# Patient Record
Sex: FEMALE | Race: OTHER | HISPANIC OR LATINO
[De-identification: names, ages, dates, MRNs, and addresses within clinical notes are randomized per-mention and may not be internally consistent; named-entity substitution may affect disease eponyms.]

---

## 2017-09-12 ENCOUNTER — RESULT REVIEW (OUTPATIENT)
Age: 62
End: 2017-09-12

## 2019-01-22 ENCOUNTER — RX RENEWAL (OUTPATIENT)
Age: 64
End: 2019-01-22

## 2019-02-25 ENCOUNTER — RX RENEWAL (OUTPATIENT)
Age: 64
End: 2019-02-25

## 2019-02-27 ENCOUNTER — RECORD ABSTRACTING (OUTPATIENT)
Age: 64
End: 2019-02-27

## 2019-02-27 DIAGNOSIS — Z82.49 FAMILY HISTORY OF ISCHEMIC HEART DISEASE AND OTHER DISEASES OF THE CIRCULATORY SYSTEM: ICD-10-CM

## 2019-02-27 DIAGNOSIS — Z83.3 FAMILY HISTORY OF DIABETES MELLITUS: ICD-10-CM

## 2019-02-27 DIAGNOSIS — Z80.42 FAMILY HISTORY OF MALIGNANT NEOPLASM OF PROSTATE: ICD-10-CM

## 2019-03-09 ENCOUNTER — APPOINTMENT (OUTPATIENT)
Dept: INTERNAL MEDICINE | Facility: CLINIC | Age: 64
End: 2019-03-09
Payer: MEDICAID

## 2019-03-09 VITALS
DIASTOLIC BLOOD PRESSURE: 60 MMHG | HEIGHT: 63 IN | BODY MASS INDEX: 23.39 KG/M2 | SYSTOLIC BLOOD PRESSURE: 110 MMHG | HEART RATE: 88 BPM | WEIGHT: 132 LBS

## 2019-03-09 PROCEDURE — 99213 OFFICE O/P EST LOW 20 MIN: CPT | Mod: 25

## 2019-03-09 PROCEDURE — 36415 COLL VENOUS BLD VENIPUNCTURE: CPT

## 2019-03-11 ENCOUNTER — RX RENEWAL (OUTPATIENT)
Age: 64
End: 2019-03-11

## 2019-03-11 LAB
ALBUMIN SERPL ELPH-MCNC: 4.3 G/DL
ALP BLD-CCNC: 61 U/L
ALT SERPL-CCNC: 18 U/L
ANION GAP SERPL CALC-SCNC: 10 MMOL/L
AST SERPL-CCNC: 19 U/L
BILIRUB SERPL-MCNC: 0.4 MG/DL
BUN SERPL-MCNC: 15 MG/DL
CALCIUM SERPL-MCNC: 9.8 MG/DL
CHLORIDE SERPL-SCNC: 101 MMOL/L
CHOLEST SERPL-MCNC: 191 MG/DL
CHOLEST/HDLC SERPL: 2.9 RATIO
CK SERPL-CCNC: 134 U/L
CO2 SERPL-SCNC: 27 MMOL/L
CREAT SERPL-MCNC: 0.59 MG/DL
GLUCOSE SERPL-MCNC: 119 MG/DL
HBA1C MFR BLD HPLC: 6.3 %
HDLC SERPL-MCNC: 66 MG/DL
LDLC SERPL CALC-MCNC: 103 MG/DL
POTASSIUM SERPL-SCNC: 4.7 MMOL/L
PROT SERPL-MCNC: 6.7 G/DL
SODIUM SERPL-SCNC: 138 MMOL/L
TRIGL SERPL-MCNC: 108 MG/DL

## 2019-03-11 NOTE — PLAN
[FreeTextEntry1] : check labs\par Renal meds\par Return 3 months\par order glucometer No indicators present

## 2019-03-11 NOTE — PHYSICAL EXAM
[No Acute Distress] : no acute distress [Well Nourished] : well nourished [No JVD] : no jugular venous distention [No Respiratory Distress] : no respiratory distress  [Clear to Auscultation] : lungs were clear to auscultation bilaterally [Normal Rate] : normal rate  [Regular Rhythm] : with a regular rhythm [Normal S1, S2] : normal S1 and S2 [No Murmur] : no murmur heard [No Carotid Bruits] : no carotid bruits [No Edema] : there was no peripheral edema [Soft] : abdomen soft [de-identified] : no xanthelasma

## 2019-03-11 NOTE — HISTORY OF PRESENT ILLNESS
[FreeTextEntry1] : DM f/u - not seen in 5 months [de-identified] : Diabetes:\par The patient has been diagnosed with diabetes since 2018.  The frequency of monitoring is NA.  Hypoglycemic episodes are not known.  The results of the last hemoglobin A1c's were 7.5 Oct 2018.  Side effects of the medications include none. Compliance with the medical regimen has been good.  \par Hypercholesterolemia: Last  Oct\par Since the last visit, the patient hhas no complaints. Major cardiovascular risk factors include hyperlipidemia and diabetes. The LDL goal for this patient is < 100 mg/dl.  Dietary modifications have included a low fat diet.  Exercise modifications have included walking. Responses to the medications have been unknown. The patient's adherence to the medication treatment is good. the patient's weight has CHANGED BY -7. The side effects of the medication include none; the patient specifically denies myalgias.

## 2019-03-11 NOTE — REVIEW OF SYSTEMS
[Heartburn] : heartburn [Chest Pain] : no chest pain [Palpitations] : no palpitations [Frequency] : no frequency [Joint Pain] : no joint pain [Skin Rash] : no skin rash [Headache] : no headache [Dizziness] : no dizziness

## 2019-03-11 NOTE — ASSESSMENT
[FreeTextEntry1] : Check glucose and A1c. Urged to increase efforts at weight loss through diet and exercise. Nutrition counseling, as well as eye and  foot care, discussed. Continue medications. Check lipid profile with goal of LDL less than 100 mg/dl. A1c goal is less than 7.0. Last A1c was NOT at goal 7.5 off med\par \par Check lipid profile and liver function tests. Urged low cholesterol diet as well as weight loss through diet and exercise. Continue medications. LDL goal is less than 100 mg/dl. Last LDL was NOT at goal, 167

## 2019-03-12 ENCOUNTER — RX RENEWAL (OUTPATIENT)
Age: 64
End: 2019-03-12

## 2019-06-05 ENCOUNTER — APPOINTMENT (OUTPATIENT)
Dept: PODIATRY | Facility: CLINIC | Age: 64
End: 2019-06-05
Payer: MEDICARE

## 2019-06-05 VITALS
HEIGHT: 63 IN | WEIGHT: 132 LBS | DIASTOLIC BLOOD PRESSURE: 70 MMHG | BODY MASS INDEX: 23.39 KG/M2 | SYSTOLIC BLOOD PRESSURE: 120 MMHG

## 2019-06-05 PROCEDURE — 99203 OFFICE O/P NEW LOW 30 MIN: CPT | Mod: 25

## 2019-06-05 PROCEDURE — 11721 DEBRIDE NAIL 6 OR MORE: CPT | Mod: 59

## 2019-06-05 NOTE — REASON FOR VISIT
[Initial Evaluation] : an initial evaluation [Family Member] : family member [FreeTextEntry1] : bilateral foot pain

## 2019-06-05 NOTE — PHYSICAL EXAM
[General Appearance - Alert] : alert [General Appearance - In No Acute Distress] : in no acute distress [Deep Tendon Reflexes (DTR)] : deep tendon reflexes were 2+ and symmetric [No Focal Deficits] : no focal deficits [Motor Exam] : the motor exam was normal [Impaired Insight] : insight and judgment were intact [Oriented To Time, Place, And Person] : oriented to person, place, and time [Affect] : the affect was normal [Right Foot Was Examined] : The right foot was examined [Left Foot Was Examined] : The left foot was examined [Normal Appearance] : normal in appearance [Delayed in the Right Toes] : delayed in the toes [Normal in Appearance] : normal in appearance [Normal] : normal [Full ROM] : with full range of motion [2+] : 2+ in the dorsalis pedis [Vibration Dec.] : diminished vibratory sensation at the level of the toes [Diminished Throughout Right Foot] : diminished tactile sensation with monofilament testing throughout right foot [Position Sense Dec.] : diminished position sense at the level of the toes [Diminished Throughout Left Foot] : diminished tactile sensation with monofilament testing throughout left foot [Tenderness] : not tender [Erythema] : not erythematous [Swelling] : not swollen [FreeTextEntry2] : hallux nails [FreeTextEntry6] : hallux nails [FreeTextEntry1] : 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

## 2019-06-05 NOTE — HISTORY OF PRESENT ILLNESS
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

## 2019-06-05 NOTE — REVIEW OF SYSTEMS
[Arthralgias] : arthralgias [Joint Stiffness] : joint stiffness [Negative] : Neurological [Joint Swelling] : no joint swelling

## 2019-06-05 NOTE — PROCEDURE
[FreeTextEntry1] : I had a lengthy discussion with the patient regarding the way they should be cutting a nail in an effort to help prevent recurrence of this problem. I also revised self treatment should not be attempted and should there be any redness or pain on the side of the nail rather than treating it themselves they should call the office to make a follow up.\par Treatment options d/w her daughter\par Using sterile instrumentation and aggressive slant back procedure was done to the affected border. Upon removal of the distal aspect of the nail one notes a severely callused nail groove. At that point using a sterile 15 blade as well as the aid of a Holland blade the area was debrided, packed with Silvadene and a dry sterile bandage applied. Discharge instructions were given to the patient and advised bacitracin daily for the next 3-5 days and if not completely better they should contact the office immediately.\par

## 2019-06-10 ENCOUNTER — RX RENEWAL (OUTPATIENT)
Age: 64
End: 2019-06-10

## 2019-06-22 ENCOUNTER — APPOINTMENT (OUTPATIENT)
Dept: INTERNAL MEDICINE | Facility: CLINIC | Age: 64
End: 2019-06-22

## 2019-07-08 ENCOUNTER — RX RENEWAL (OUTPATIENT)
Age: 64
End: 2019-07-08

## 2019-07-11 ENCOUNTER — APPOINTMENT (OUTPATIENT)
Dept: INTERNAL MEDICINE | Facility: CLINIC | Age: 64
End: 2019-07-11
Payer: MEDICARE

## 2019-07-11 VITALS
BODY MASS INDEX: 23.92 KG/M2 | DIASTOLIC BLOOD PRESSURE: 60 MMHG | WEIGHT: 135 LBS | HEIGHT: 63 IN | SYSTOLIC BLOOD PRESSURE: 110 MMHG | HEART RATE: 91 BPM

## 2019-07-11 PROCEDURE — G0009: CPT

## 2019-07-11 PROCEDURE — 36415 COLL VENOUS BLD VENIPUNCTURE: CPT

## 2019-07-11 PROCEDURE — 90670 PCV13 VACCINE IM: CPT

## 2019-07-11 PROCEDURE — 99213 OFFICE O/P EST LOW 20 MIN: CPT | Mod: 25

## 2019-07-11 RX ORDER — ATORVASTATIN CALCIUM 40 MG/1
40 TABLET, FILM COATED ORAL DAILY
Qty: 90 | Refills: 0 | Status: DISCONTINUED | COMMUNITY
Start: 2019-03-12 | End: 2019-07-11

## 2019-07-11 RX ORDER — IBUPROFEN 400 MG/1
400 TABLET, FILM COATED ORAL 3 TIMES DAILY
Qty: 100 | Refills: 1 | Status: DISCONTINUED | COMMUNITY
Start: 2019-03-09 | End: 2019-07-11

## 2019-07-11 NOTE — ASSESSMENT
[FreeTextEntry1] : DM- Check glucose and A1c. Nutrition counseling, as well as eye and  foot care, discussed. Continue medication. Check lipid profile with goal of LDL less than 100 mg/dl. A1c goal is less than 7.0. Last A1c was at goal 6.3\par \par Check lipid profile and liver function tests. Urged low cholesterol diet as well as weight loss through diet and exercise. Continue medications. LDL goal is less than 100 mg/dl. Last LDL was NOT at goal, 103. Will try simvastatin to see if that causes myalgias.\par \par Check Hep C. \par \par Risks/benefits of Prevnar 13 vaccine d/w patient and patient understands and accepts.

## 2019-07-11 NOTE — PHYSICAL EXAM
[No Acute Distress] : no acute distress [No JVD] : no jugular venous distention [No Respiratory Distress] : no respiratory distress  [Clear to Auscultation] : lungs were clear to auscultation bilaterally [Normal Rate] : normal rate  [Regular Rhythm] : with a regular rhythm [Normal S1, S2] : normal S1 and S2 [No Murmur] : no murmur heard [No Edema] : there was no peripheral edema [de-identified] : no xanthelasmas

## 2019-07-11 NOTE — REVIEW OF SYSTEMS
[Heartburn] : heartburn [Muscle Pain] : muscle pain [Chest Pain] : no chest pain [Joint Pain] : no joint pain [Palpitations] : no palpitations [Frequency] : no frequency [Headache] : no headache [Skin Rash] : no skin rash [Dizziness] : no dizziness

## 2019-07-12 ENCOUNTER — APPOINTMENT (OUTPATIENT)
Dept: PODIATRY | Facility: CLINIC | Age: 64
End: 2019-07-12

## 2019-07-12 LAB
ALBUMIN SERPL ELPH-MCNC: 4.2 G/DL
ALP BLD-CCNC: 75 U/L
ALT SERPL-CCNC: 13 U/L
ANION GAP SERPL CALC-SCNC: 13 MMOL/L
AST SERPL-CCNC: 20 U/L
BILIRUB SERPL-MCNC: 0.3 MG/DL
BUN SERPL-MCNC: 19 MG/DL
CALCIUM SERPL-MCNC: 10.1 MG/DL
CHLORIDE SERPL-SCNC: 102 MMOL/L
CHOLEST SERPL-MCNC: 203 MG/DL
CHOLEST/HDLC SERPL: 3.6 RATIO
CO2 SERPL-SCNC: 25 MMOL/L
CREAT SERPL-MCNC: 0.64 MG/DL
ESTIMATED AVERAGE GLUCOSE: 146 MG/DL
GLUCOSE SERPL-MCNC: 103 MG/DL
HBA1C MFR BLD HPLC: 6.7 %
HDLC SERPL-MCNC: 57 MG/DL
LDLC SERPL CALC-MCNC: 107 MG/DL
POTASSIUM SERPL-SCNC: 4.7 MMOL/L
PROT SERPL-MCNC: 7.3 G/DL
SODIUM SERPL-SCNC: 140 MMOL/L
TRIGL SERPL-MCNC: 194 MG/DL

## 2019-10-02 ENCOUNTER — APPOINTMENT (OUTPATIENT)
Dept: INTERNAL MEDICINE | Facility: CLINIC | Age: 64
End: 2019-10-02
Payer: MEDICARE

## 2019-10-02 ENCOUNTER — LABORATORY RESULT (OUTPATIENT)
Age: 64
End: 2019-10-02

## 2019-10-02 VITALS
WEIGHT: 133 LBS | DIASTOLIC BLOOD PRESSURE: 70 MMHG | BODY MASS INDEX: 23.56 KG/M2 | HEART RATE: 87 BPM | SYSTOLIC BLOOD PRESSURE: 108 MMHG

## 2019-10-02 PROCEDURE — 99215 OFFICE O/P EST HI 40 MIN: CPT | Mod: 25

## 2019-10-02 PROCEDURE — 90662 IIV NO PRSV INCREASED AG IM: CPT

## 2019-10-02 PROCEDURE — G0008: CPT

## 2019-10-02 PROCEDURE — G0438: CPT

## 2019-10-02 PROCEDURE — G0402 INITIAL PREVENTIVE EXAM: CPT

## 2019-10-02 PROCEDURE — 36415 COLL VENOUS BLD VENIPUNCTURE: CPT

## 2019-10-02 PROCEDURE — 93000 ELECTROCARDIOGRAM COMPLETE: CPT

## 2019-10-02 NOTE — PHYSICAL EXAM
[No Acute Distress] : no acute distress [Well Nourished] : well nourished [Well Developed] : well developed [Well-Appearing] : well-appearing [Normal Sclera/Conjunctiva] : normal sclera/conjunctiva [PERRL] : pupils equal round and reactive to light [Normal Outer Ear/Nose] : the outer ears and nose were normal in appearance [EOMI] : extraocular movements intact [Normal Oropharynx] : the oropharynx was normal [No JVD] : no jugular venous distention [No Lymphadenopathy] : no lymphadenopathy [Supple] : supple [Thyroid Normal, No Nodules] : the thyroid was normal and there were no nodules present [No Respiratory Distress] : no respiratory distress  [No Accessory Muscle Use] : no accessory muscle use [Clear to Auscultation] : lungs were clear to auscultation bilaterally [Normal Rate] : normal rate  [Regular Rhythm] : with a regular rhythm [Normal S1, S2] : normal S1 and S2 [No Carotid Bruits] : no carotid bruits [No Murmur] : no murmur heard [No Varicosities] : no varicosities [No Abdominal Bruit] : a ~M bruit was not heard ~T in the abdomen [Pedal Pulses Present] : the pedal pulses are present [No Edema] : there was no peripheral edema [No Palpable Aorta] : no palpable aorta [No Extremity Clubbing/Cyanosis] : no extremity clubbing/cyanosis [Soft] : abdomen soft [Non Tender] : non-tender [Non-distended] : non-distended [No Masses] : no abdominal mass palpated [No HSM] : no HSM [Normal Bowel Sounds] : normal bowel sounds [Normal Posterior Cervical Nodes] : no posterior cervical lymphadenopathy [Normal Anterior Cervical Nodes] : no anterior cervical lymphadenopathy [No CVA Tenderness] : no CVA  tenderness [No Spinal Tenderness] : no spinal tenderness [No Joint Swelling] : no joint swelling [Grossly Normal Strength/Tone] : grossly normal strength/tone [No Rash] : no rash [Coordination Grossly Intact] : coordination grossly intact [No Focal Deficits] : no focal deficits [Normal Gait] : normal gait [Deep Tendon Reflexes (DTR)] : deep tendon reflexes were 2+ and symmetric [Normal Affect] : the affect was normal [Normal Insight/Judgement] : insight and judgment were intact [de-identified] : +small t. palatini

## 2019-10-02 NOTE — PLAN
[FreeTextEntry1] : check labs and EKG\par Continue meds except stop ranitidine and start famotidine\par Flu vaccine\par Return 3 months

## 2019-10-02 NOTE — HISTORY OF PRESENT ILLNESS
[FreeTextEntry1] : here for f/u of DM, Chol\par Wants flu vaccine [de-identified] : Diabetes:\par The patient has been diagnosed with diabetes since 2018.  The frequency of monitoring is NA.  Hypoglycemic episodes are not known.  The results of the last hemoglobin A1c's were 6.7 July, 6.3 Mar 2019;  7.5 Oct 2018.  Side effects of the medications include none. Compliance with the medical regimen has been good.  \par \par Hypercholesterolemia: Last  July\par Since the last visit, the patient has no complaints. Major cardiovascular risk factors include hyperlipidemia and diabetes. The LDL goal for this patient is < 100 mg/dl.  Dietary modifications have included a low fat diet.  Exercise modifications have included walking. Responses to the medications have been FAIR. The patient's adherence to the medication treatment is POOR. The side effects of the medication include none; the patient specifically STATES SHE GETS MYALGIAS. The patient's weight has CHANGED BY -2

## 2019-10-02 NOTE — REVIEW OF SYSTEMS
[Heartburn] : heartburn [Nocturia] : nocturia [Fever] : no fever [Chills] : no chills [Hearing Loss] : no hearing loss [Chest Pain] : no chest pain [Palpitations] : no palpitations [Shortness Of Breath] : no shortness of breath [Wheezing] : no wheezing [Cough] : no cough [Abdominal Pain] : no abdominal pain [Nausea] : no nausea [Vomiting] : no vomiting [Dysuria] : no dysuria [Hematuria] : no hematuria [Frequency] : no frequency [Muscle Weakness] : no muscle weakness [Skin Rash] : no skin rash [Headache] : no headache [Dizziness] : no dizziness [Memory Loss] : no memory loss [Anxiety] : no anxiety [Depression] : no depression [Easy Bleeding] : no easy bleeding [FreeTextEntry3] : last eye exam 2 years ago; + eyewear [FreeTextEntry8] : nocturia x 1

## 2019-10-02 NOTE — ASSESSMENT
[FreeTextEntry1] : DM- Check glucose and A1c. Nutrition counseling, as well as eye and  foot care, discussed. Continue medication. Check lipid profile with goal of LDL less than 100 mg/dl. A1c goal is less than 7.0. Last A1c was at goal 6.7\par \par Check lipid profile and liver function tests. Urged low cholesterol diet as well as weight loss through diet and exercise. Continue medications. LDL goal is less than 100 mg/dl. Last LDL was NOT at goal, 107. Will try simvastatin to see if that causes myalgias but pt defers currently.\par \par Risks/benefits of flu vaccine d/w patient and patient understands and accepts.

## 2019-10-03 LAB
ALBUMIN SERPL ELPH-MCNC: 4.3 G/DL
ALP BLD-CCNC: 69 U/L
ALT SERPL-CCNC: 15 U/L
ANION GAP SERPL CALC-SCNC: 12 MMOL/L
APPEARANCE: CLEAR
AST SERPL-CCNC: 18 U/L
BASOPHILS # BLD AUTO: 0.06 K/UL
BASOPHILS NFR BLD AUTO: 0.9 %
BILIRUB SERPL-MCNC: 0.7 MG/DL
BILIRUBIN URINE: NEGATIVE
BLOOD URINE: NEGATIVE
BUN SERPL-MCNC: 16 MG/DL
CALCIUM SERPL-MCNC: 10 MG/DL
CHLORIDE SERPL-SCNC: 102 MMOL/L
CHOLEST SERPL-MCNC: 246 MG/DL
CHOLEST/HDLC SERPL: 3.8 RATIO
CK SERPL-CCNC: 162 U/L
CO2 SERPL-SCNC: 27 MMOL/L
COLOR: YELLOW
CREAT SERPL-MCNC: 0.67 MG/DL
EOSINOPHIL # BLD AUTO: 0.22 K/UL
EOSINOPHIL NFR BLD AUTO: 3.4 %
ESTIMATED AVERAGE GLUCOSE: 143 MG/DL
GLUCOSE QUALITATIVE U: NEGATIVE
GLUCOSE SERPL-MCNC: 153 MG/DL
HBA1C MFR BLD HPLC: 6.6 %
HCT VFR BLD CALC: 42.4 %
HDLC SERPL-MCNC: 65 MG/DL
HGB BLD-MCNC: 13.2 G/DL
IMM GRANULOCYTES NFR BLD AUTO: 0.6 %
KETONES URINE: NEGATIVE
LDLC SERPL CALC-MCNC: 156 MG/DL
LEUKOCYTE ESTERASE URINE: ABNORMAL
LYMPHOCYTES # BLD AUTO: 1.9 K/UL
LYMPHOCYTES NFR BLD AUTO: 29.6 %
MAN DIFF?: NORMAL
MCHC RBC-ENTMCNC: 29.3 PG
MCHC RBC-ENTMCNC: 31.1 GM/DL
MCV RBC AUTO: 94 FL
MONOCYTES # BLD AUTO: 0.65 K/UL
MONOCYTES NFR BLD AUTO: 10.1 %
NEUTROPHILS # BLD AUTO: 3.55 K/UL
NEUTROPHILS NFR BLD AUTO: 55.4 %
NITRITE URINE: NEGATIVE
PH URINE: 6.5
PLATELET # BLD AUTO: 276 K/UL
POTASSIUM SERPL-SCNC: 4.9 MMOL/L
PROT SERPL-MCNC: 7.1 G/DL
PROTEIN URINE: NORMAL
RBC # BLD: 4.51 M/UL
RBC # FLD: 13.8 %
SODIUM SERPL-SCNC: 141 MMOL/L
SPECIFIC GRAVITY URINE: 1.02
TRIGL SERPL-MCNC: 126 MG/DL
TSH SERPL-ACNC: 1.16 UIU/ML
UROBILINOGEN URINE: NORMAL
WBC # FLD AUTO: 6.42 K/UL

## 2019-10-08 ENCOUNTER — NON-APPOINTMENT (OUTPATIENT)
Age: 64
End: 2019-10-08

## 2019-10-14 ENCOUNTER — RX RENEWAL (OUTPATIENT)
Age: 64
End: 2019-10-14

## 2019-11-27 ENCOUNTER — APPOINTMENT (OUTPATIENT)
Dept: INTERNAL MEDICINE | Facility: CLINIC | Age: 64
End: 2019-11-27
Payer: MEDICARE

## 2019-11-27 VITALS
WEIGHT: 133 LBS | RESPIRATION RATE: 18 BRPM | OXYGEN SATURATION: 97 % | HEIGHT: 63 IN | DIASTOLIC BLOOD PRESSURE: 60 MMHG | SYSTOLIC BLOOD PRESSURE: 108 MMHG | BODY MASS INDEX: 23.57 KG/M2 | TEMPERATURE: 98.9 F | HEART RATE: 102 BPM

## 2019-11-27 PROCEDURE — 99496 TRANSJ CARE MGMT HIGH F2F 7D: CPT | Mod: 25

## 2019-11-27 PROCEDURE — 36415 COLL VENOUS BLD VENIPUNCTURE: CPT

## 2019-11-27 PROCEDURE — 99213 OFFICE O/P EST LOW 20 MIN: CPT | Mod: 25

## 2019-11-27 NOTE — ASSESSMENT
[FreeTextEntry1] : Doing well status post treatment of community-acquired pneumonia with positive RSV test. Discussed importance of rechecking chest x-ray  in 3 more weeks.

## 2019-11-27 NOTE — REVIEW OF SYSTEMS
[Cough] : cough [Fever] : no fever [Chills] : no chills [Fatigue] : no fatigue [Chest Pain] : no chest pain [Orthopnea] : no orthopnea [Shortness Of Breath] : no shortness of breath [Wheezing] : no wheezing [Dyspnea on Exertion] : no dyspnea on exertion

## 2019-11-27 NOTE — HISTORY OF PRESENT ILLNESS
[Post-hospitalization from ___ Hospital] : Post-hospitalization from [unfilled] Hospital [Admitted on: ___] : The patient was admitted on [unfilled] [Discharged on ___] : discharged on [unfilled] [Discharge Summary] : discharge summary [Pertinent Labs] : pertinent labs [Radiology Findings] : radiology findings [Discharge Med List] : discharge medication list [Med Reconciliation] : medication reconciliation has been completed [Patient Contacted By: ____] : and contacted by [unfilled] [FreeTextEntry2] : amadeo was admitted with likely respiratory syncytial virus and pneumonia. She had an abnormal urinalysis but a culture which grew out mixed organisms. She was discharged to continue and complete the Z-Roberto and cefuroxime therapy. Since discharge she has felt better although still has a slightly nonproductive cough. Denies fever or shortness of breath.

## 2019-11-27 NOTE — PHYSICAL EXAM
[No JVD] : no jugular venous distention [Normal] : no respiratory distress, lungs were clear to auscultation bilaterally and no accessory muscle use [Normal Rate] : normal rate  [Regular Rhythm] : with a regular rhythm [Normal S1, S2] : normal S1 and S2 [No Edema] : there was no peripheral edema

## 2019-11-30 LAB
ALBUMIN SERPL ELPH-MCNC: 4 G/DL
ALP BLD-CCNC: 89 U/L
ALT SERPL-CCNC: 49 U/L
ANION GAP SERPL CALC-SCNC: 14 MMOL/L
AST SERPL-CCNC: 33 U/L
BASOPHILS # BLD AUTO: 0.06 K/UL
BASOPHILS NFR BLD AUTO: 0.7 %
BILIRUB SERPL-MCNC: 0.2 MG/DL
BUN SERPL-MCNC: 14 MG/DL
CALCIUM SERPL-MCNC: 10.1 MG/DL
CHLORIDE SERPL-SCNC: 97 MMOL/L
CO2 SERPL-SCNC: 24 MMOL/L
CREAT SERPL-MCNC: 0.62 MG/DL
EOSINOPHIL # BLD AUTO: 0.36 K/UL
EOSINOPHIL NFR BLD AUTO: 4.3 %
GLUCOSE SERPL-MCNC: 162 MG/DL
HCT VFR BLD CALC: 40.5 %
HGB BLD-MCNC: 13.1 G/DL
IMM GRANULOCYTES NFR BLD AUTO: 2.2 %
LYMPHOCYTES # BLD AUTO: 2.47 K/UL
LYMPHOCYTES NFR BLD AUTO: 29.2 %
MAN DIFF?: NORMAL
MCHC RBC-ENTMCNC: 29.2 PG
MCHC RBC-ENTMCNC: 32.3 GM/DL
MCV RBC AUTO: 90.2 FL
MONOCYTES # BLD AUTO: 0.76 K/UL
MONOCYTES NFR BLD AUTO: 9 %
NEUTROPHILS # BLD AUTO: 4.61 K/UL
NEUTROPHILS NFR BLD AUTO: 54.6 %
PLATELET # BLD AUTO: 466 K/UL
POTASSIUM SERPL-SCNC: 4.6 MMOL/L
PROT SERPL-MCNC: 7.2 G/DL
RBC # BLD: 4.49 M/UL
RBC # FLD: 13.1 %
SODIUM SERPL-SCNC: 135 MMOL/L
WBC # FLD AUTO: 8.45 K/UL

## 2019-12-02 ENCOUNTER — APPOINTMENT (OUTPATIENT)
Dept: CARE COORDINATION | Facility: HOME HEALTH | Age: 64
End: 2019-12-02
Payer: MEDICARE

## 2019-12-02 VITALS
OXYGEN SATURATION: 98 % | SYSTOLIC BLOOD PRESSURE: 108 MMHG | RESPIRATION RATE: 18 BRPM | DIASTOLIC BLOOD PRESSURE: 60 MMHG | HEART RATE: 87 BPM

## 2019-12-02 PROCEDURE — 99349 HOME/RES VST EST MOD MDM 40: CPT

## 2019-12-02 NOTE — PLAN
[FreeTextEntry1] : "[employer] TCM STARS teaching: Advised patient to adhere to all medications. Patient understands proper use of oxygen therapy. Increase activity as tolerated and maintain optimal activity levels. Continue coughing and deep breathing exercises including use of Incentive Spirometry. Receive routine pneumococcal and influenza vaccinations. Identify early signs and sx of disease and notify NP/MD. Maintain proper nutrition and hydration. Follow up with Pulmonologist within 7 days of discharge. Contact information given, patient advised to call with any questions/concerns.\par \par

## 2019-12-02 NOTE — HISTORY OF PRESENT ILLNESS
[FreeTextEntry1] : PNA [de-identified] : Pt being seen in her home, states that cough has gotten much better.  Still coughing and does not feel her strength has fully returned.  \par Pt completed to antibiotics. \par DM- stable\par Was seen by PCP last Wednesday.

## 2019-12-26 ENCOUNTER — RESULT REVIEW (OUTPATIENT)
Age: 64
End: 2019-12-26

## 2020-01-02 ENCOUNTER — APPOINTMENT (OUTPATIENT)
Dept: INTERNAL MEDICINE | Facility: CLINIC | Age: 65
End: 2020-01-02
Payer: MEDICARE

## 2020-01-02 VITALS
WEIGHT: 138 LBS | HEART RATE: 90 BPM | DIASTOLIC BLOOD PRESSURE: 60 MMHG | BODY MASS INDEX: 24.45 KG/M2 | SYSTOLIC BLOOD PRESSURE: 108 MMHG

## 2020-01-02 PROCEDURE — 99213 OFFICE O/P EST LOW 20 MIN: CPT | Mod: 25

## 2020-01-02 PROCEDURE — 36415 COLL VENOUS BLD VENIPUNCTURE: CPT

## 2020-01-02 NOTE — PLAN
[FreeTextEntry1] : Check labs and continue meds\par Start WelChol 3 tablets daily\par Return 3 months

## 2020-01-02 NOTE — ASSESSMENT
[FreeTextEntry1] : DM- Check glucose and A1c. Nutrition counseling, as well as eye and foot care, discussed. Continue medication. Check lipid profile with goal of LDL less than 100 mg/dl. A1c goal is less than 7.0. Last A1c was at goal 6.6\par \par Check lipid profile and liver function tests. Urged low cholesterol diet as well as weight loss through diet and exercise. Continue medications. LDL goal is less than 100 mg/dl. Last LDL was NOT at goal, 156. As she gets myalgias with statins we discussed trying Welchol. We'll start with 3 tablets daily\par

## 2020-01-02 NOTE — REVIEW OF SYSTEMS
[Fever] : no fever [Chills] : no chills [Fatigue] : no fatigue [Orthopnea] : no orthopnea [Chest Pain] : no chest pain [Wheezing] : no wheezing [Shortness Of Breath] : no shortness of breath [Cough] : no cough [Dyspnea on Exertion] : no dyspnea on exertion

## 2020-01-02 NOTE — PHYSICAL EXAM
[No JVD] : no jugular venous distention [Normal Rate] : normal rate  [Normal] : no respiratory distress, lungs were clear to auscultation bilaterally and no accessory muscle use [Normal S1, S2] : normal S1 and S2 [Regular Rhythm] : with a regular rhythm [No Edema] : there was no peripheral edema

## 2020-01-02 NOTE — HISTORY OF PRESENT ILLNESS
[FreeTextEntry1] : hyperlipidemia, DM f/u  [de-identified] : Diabetes:\par The patient has been diagnosed with diabetes since 2018.  The frequency of monitoring is NA.  Hypoglycemic episodes are not known.  The results of the last hemoglobin A1c's were 6.6 Oct, 6.7 July, 6.3 Mar 2019;  7.5 Oct 2018.  Side effects of the medications include none. Compliance with the medical regimen has been good.  \par \par Hypercholesterolemia: Last  Oct\par Since the last visit, the patient has no complaints. Major cardiovascular risk factors include hyperlipidemia and diabetes. The LDL goal for this patient is < 100 mg/dl.  Dietary modifications have included a low fat diet.  Exercise modifications have included walking. Responses to the medications have been FAIR. The patient's adherence to the medication treatment is POOR. The side effects of the medication include none; the patient specifically STATES SHE GETS MYALGIAS. The patient's weight has CHANGED BY +5

## 2020-01-03 LAB
ALBUMIN SERPL ELPH-MCNC: 4.4 G/DL
ALP BLD-CCNC: 73 U/L
ALT SERPL-CCNC: 19 U/L
ANION GAP SERPL CALC-SCNC: 13 MMOL/L
AST SERPL-CCNC: 20 U/L
BILIRUB SERPL-MCNC: 0.5 MG/DL
BUN SERPL-MCNC: 20 MG/DL
CALCIUM SERPL-MCNC: 10.1 MG/DL
CHLORIDE SERPL-SCNC: 100 MMOL/L
CHOLEST SERPL-MCNC: 255 MG/DL
CHOLEST/HDLC SERPL: 3.6 RATIO
CO2 SERPL-SCNC: 26 MMOL/L
CREAT SERPL-MCNC: 0.57 MG/DL
ESTIMATED AVERAGE GLUCOSE: 154 MG/DL
GLUCOSE SERPL-MCNC: 127 MG/DL
HBA1C MFR BLD HPLC: 7 %
HDLC SERPL-MCNC: 71 MG/DL
LDLC SERPL CALC-MCNC: 159 MG/DL
POTASSIUM SERPL-SCNC: 5.1 MMOL/L
PROT SERPL-MCNC: 7.4 G/DL
SODIUM SERPL-SCNC: 139 MMOL/L
TRIGL SERPL-MCNC: 126 MG/DL

## 2020-01-04 ENCOUNTER — RX RENEWAL (OUTPATIENT)
Age: 65
End: 2020-01-04

## 2020-03-28 ENCOUNTER — APPOINTMENT (OUTPATIENT)
Dept: INTERNAL MEDICINE | Facility: CLINIC | Age: 65
End: 2020-03-28

## 2020-06-20 ENCOUNTER — APPOINTMENT (OUTPATIENT)
Dept: INTERNAL MEDICINE | Facility: CLINIC | Age: 65
End: 2020-06-20
Payer: MEDICARE

## 2020-06-20 VITALS
HEIGHT: 63 IN | BODY MASS INDEX: 24.45 KG/M2 | DIASTOLIC BLOOD PRESSURE: 70 MMHG | HEART RATE: 85 BPM | WEIGHT: 138 LBS | SYSTOLIC BLOOD PRESSURE: 110 MMHG

## 2020-06-20 VITALS
DIASTOLIC BLOOD PRESSURE: 70 MMHG | BODY MASS INDEX: 24.45 KG/M2 | SYSTOLIC BLOOD PRESSURE: 110 MMHG | WEIGHT: 138 LBS | HEIGHT: 63 IN | HEART RATE: 85 BPM

## 2020-06-20 PROCEDURE — 99213 OFFICE O/P EST LOW 20 MIN: CPT | Mod: 25

## 2020-06-20 PROCEDURE — 36415 COLL VENOUS BLD VENIPUNCTURE: CPT

## 2020-06-20 NOTE — REVIEW OF SYSTEMS
[Chills] : no chills [Fever] : no fever [Chest Pain] : no chest pain [Orthopnea] : no orthopnea [Fatigue] : no fatigue [Cough] : no cough [Wheezing] : no wheezing [Shortness Of Breath] : no shortness of breath [Dyspnea on Exertion] : no dyspnea on exertion

## 2020-06-20 NOTE — ASSESSMENT
[FreeTextEntry1] : DM- Check glucose and A1c. Nutrition counseling, as well as eye and foot care, discussed. Continue medication. Check lipid profile with goal of LDL less than 100 mg/dl. A1c goal is less than 7.0. Last A1c was ALMOST at goal 7.0. Generally has low bp's. WIll try to start acei nv for renoprotection. check rft's. May try Jardiance if A1c >7.0.\par \par CHOL- Check lipid profile and liver function tests. Urged low cholesterol diet as well as weight loss through diet and exercise. Continue medications. LDL goal is less than 100 mg/dl. Last LDL was NOT at goal, 159. INSURANCE WILL NOT COVER MED AND SHE CANNOT AFFORD IT ACC TO PT AND HER DTR.\par \par I discussed covid antibody testing with patient and she consents.

## 2020-06-20 NOTE — PLAN
[FreeTextEntry1] : check labs and refill meds\par May start ACE inhibitor next visit\par Covid antibody test\par Return 3 months

## 2020-06-20 NOTE — HISTORY OF PRESENT ILLNESS
[FreeTextEntry1] : hyperlipidemia, DM f/u \par Intolerant of statins; Welchol ordered Jan 2020 at 3 tabs daily but family states insurance would not cover\par Not seen in 5 months [de-identified] : Diabetes:\par The patient has been diagnosed with diabetes since 2018.  The frequency of monitoring is NA.  Hypoglycemic episodes are not known.  The results of the last hemoglobin A1c's were 7.0 Jan 2020; 6.6 Oct, 6.7 July, 6.3 Mar 2019;  7.5 Oct 2018.  Side effects of the medications include none. Compliance with the medical regimen has been good.  \par \par Hypercholesterolemia: Last  [place]\par Since the last visit, the patient has no complaints. Major cardiovascular risk factors include hyperlipidemia and diabetes. The LDL goal for this patient is < 100 mg/dl.  Dietary modifications have included a low fat diet.  Exercise modifications have included walking. Responses to the medications have been NA AS NOT ON MED. The patient's adherence to the medication treatment is NA. The side effects of the medication include NA; the patient specifically STATES SHE GETS MYALGIAS WITH STATINS. The patient's weight has CHANGED BY 0

## 2020-06-22 LAB
ALBUMIN SERPL ELPH-MCNC: 4.2 G/DL
ALP BLD-CCNC: 62 U/L
ALT SERPL-CCNC: 14 U/L
ANION GAP SERPL CALC-SCNC: 12 MMOL/L
AST SERPL-CCNC: 20 U/L
BILIRUB SERPL-MCNC: 0.7 MG/DL
BUN SERPL-MCNC: 14 MG/DL
CALCIUM SERPL-MCNC: 9.6 MG/DL
CHLORIDE SERPL-SCNC: 103 MMOL/L
CHOLEST SERPL-MCNC: 252 MG/DL
CHOLEST/HDLC SERPL: 3.7 RATIO
CO2 SERPL-SCNC: 24 MMOL/L
CREAT SERPL-MCNC: 0.6 MG/DL
ESTIMATED AVERAGE GLUCOSE: 148 MG/DL
GLUCOSE SERPL-MCNC: 147 MG/DL
HBA1C MFR BLD HPLC: 6.8 %
HDLC SERPL-MCNC: 69 MG/DL
LDLC SERPL CALC-MCNC: 156 MG/DL
POTASSIUM SERPL-SCNC: 4.9 MMOL/L
PROT SERPL-MCNC: 7 G/DL
SARS-COV-2 IGG SERPL IA-ACNC: 0.1 INDEX
SARS-COV-2 IGG SERPL QL IA: NEGATIVE
SODIUM SERPL-SCNC: 140 MMOL/L
TRIGL SERPL-MCNC: 135 MG/DL

## 2020-09-26 ENCOUNTER — APPOINTMENT (OUTPATIENT)
Dept: INTERNAL MEDICINE | Facility: CLINIC | Age: 65
End: 2020-09-26
Payer: MEDICARE

## 2020-09-26 VITALS
WEIGHT: 134 LBS | SYSTOLIC BLOOD PRESSURE: 100 MMHG | DIASTOLIC BLOOD PRESSURE: 60 MMHG | HEART RATE: 90 BPM | HEIGHT: 63 IN | BODY MASS INDEX: 23.74 KG/M2

## 2020-09-26 PROCEDURE — G0009: CPT

## 2020-09-26 PROCEDURE — 36415 COLL VENOUS BLD VENIPUNCTURE: CPT

## 2020-09-26 PROCEDURE — G0008: CPT

## 2020-09-26 PROCEDURE — 90662 IIV NO PRSV INCREASED AG IM: CPT

## 2020-09-26 PROCEDURE — G0442 ANNUAL ALCOHOL SCREEN 15 MIN: CPT | Mod: 59

## 2020-09-26 PROCEDURE — 90670 PCV13 VACCINE IM: CPT

## 2020-09-26 PROCEDURE — 99213 OFFICE O/P EST LOW 20 MIN: CPT | Mod: 25

## 2020-09-26 NOTE — PLAN
[FreeTextEntry1] : check labs and continue meds\par Return 3 months\par Flu vaccine\par Pneumovax 23 vaccine

## 2020-09-26 NOTE — HISTORY OF PRESENT ILLNESS
[FreeTextEntry1] : hyperlipidemia, DM f/u \par Intolerant of statins; Welchol ordered Jan 2020 at 3 tabs daily but family states insurance would not cover\par needs Pvax 23 [de-identified] : Diabetes:\par The patient has been diagnosed with diabetes since 2018.  The frequency of monitoring is NA.  Hypoglycemic episodes are not known.  The results of the last hemoglobin A1c's were 6.8 Farideh, 7.0 Jan 2020; 6.6 Oct, 6.7 July, 6.3 Mar 2019;  7.5 Oct 2018.  Side effects of the medications include none. Compliance with the medical regimen has been good.  \par \par Hypercholesterolemia: Last  [place]\par Since the last visit, the patient has no complaints. Major cardiovascular risk factors include hyperlipidemia and diabetes. The LDL goal for this patient is < 100 mg/dl.  Dietary modifications have included a low fat diet.  Exercise modifications have included walking. Responses to the medications have been NA AS NOT ON MED. The patient's adherence to the medication treatment is NA. The side effects of the medication include NA; the patient specifically STATES SHE GETS MYALGIAS WITH STATINS. The patient's weight has CHANGED BY 0

## 2020-09-26 NOTE — ASSESSMENT
[FreeTextEntry1] : DM- Check glucose and A1c. Nutrition counseling, as well as eye and foot care, discussed. Continue medication. Check lipid profile with goal of LDL less than 100 mg/dl. A1c goal is less than 7.0. Last A1c was at goal 6.8. Generally has low bp's. WIll try to start acei nv for renoprotection but her blood pressures remain dangerously low. check rft's. May try Jardiance if A1c >7.0.\par \par CHOL- Check lipid profile and liver function tests. Urged low cholesterol diet as well as weight loss through diet and exercise. Continue medications. LDL goal is less than 100 mg/dl. Last LDL was NOT at goal, 156. INSURANCE WILL NOT COVER MED AND SHE CANNOT AFFORD IT ACC TO PT AND HER DTR.\par \par Risks/benefits of Pvax 23 vaccine discussed with patient and patient understands and accepts.\par Risks/benefits of flu vaccine discussed with patient and patient understands and accepts.\par \par

## 2020-09-26 NOTE — REVIEW OF SYSTEMS
[Fever] : no fever [Chills] : no chills [Fatigue] : no fatigue [Chest Pain] : no chest pain [Orthopnea] : no orthopnea [Shortness Of Breath] : no shortness of breath [Wheezing] : no wheezing [Cough] : no cough [Dyspnea on Exertion] : no dyspnea on exertion

## 2020-09-28 LAB
ALBUMIN SERPL ELPH-MCNC: 4.2 G/DL
ALP BLD-CCNC: 65 U/L
ALT SERPL-CCNC: 15 U/L
ANION GAP SERPL CALC-SCNC: 19 MMOL/L
AST SERPL-CCNC: 19 U/L
BILIRUB SERPL-MCNC: 0.5 MG/DL
BUN SERPL-MCNC: 15 MG/DL
CALCIUM SERPL-MCNC: 9.8 MG/DL
CHLORIDE SERPL-SCNC: 98 MMOL/L
CHOLEST SERPL-MCNC: 236 MG/DL
CHOLEST/HDLC SERPL: 3.8 RATIO
CO2 SERPL-SCNC: 25 MMOL/L
CREAT SERPL-MCNC: 0.64 MG/DL
ESTIMATED AVERAGE GLUCOSE: 146 MG/DL
GLUCOSE SERPL-MCNC: 73 MG/DL
HBA1C MFR BLD HPLC: 6.7 %
HDLC SERPL-MCNC: 62 MG/DL
LDLC SERPL CALC-MCNC: 139 MG/DL
POTASSIUM SERPL-SCNC: 5.1 MMOL/L
PROT SERPL-MCNC: 7.1 G/DL
SODIUM SERPL-SCNC: 142 MMOL/L
TRIGL SERPL-MCNC: 175 MG/DL

## 2020-12-03 ENCOUNTER — APPOINTMENT (OUTPATIENT)
Dept: INTERNAL MEDICINE | Facility: CLINIC | Age: 65
End: 2020-12-03
Payer: MEDICARE

## 2020-12-03 ENCOUNTER — NON-APPOINTMENT (OUTPATIENT)
Age: 65
End: 2020-12-03

## 2020-12-03 VITALS
HEART RATE: 101 BPM | SYSTOLIC BLOOD PRESSURE: 110 MMHG | DIASTOLIC BLOOD PRESSURE: 60 MMHG | RESPIRATION RATE: 18 BRPM | HEIGHT: 63 IN | WEIGHT: 132 LBS | BODY MASS INDEX: 23.39 KG/M2

## 2020-12-03 VITALS
BODY MASS INDEX: 23.39 KG/M2 | HEIGHT: 63 IN | DIASTOLIC BLOOD PRESSURE: 60 MMHG | WEIGHT: 132 LBS | SYSTOLIC BLOOD PRESSURE: 110 MMHG | HEART RATE: 101 BPM

## 2020-12-03 PROCEDURE — 36415 COLL VENOUS BLD VENIPUNCTURE: CPT

## 2020-12-03 PROCEDURE — G0444 DEPRESSION SCREEN ANNUAL: CPT | Mod: 59

## 2020-12-03 PROCEDURE — G0442 ANNUAL ALCOHOL SCREEN 15 MIN: CPT | Mod: 59

## 2020-12-03 PROCEDURE — 93000 ELECTROCARDIOGRAM COMPLETE: CPT | Mod: 59

## 2020-12-03 PROCEDURE — G0439: CPT

## 2020-12-03 NOTE — ASSESSMENT
[FreeTextEntry1] : DM- Check glucose and A1c. Nutrition counseling, as well as eye and foot care, discussed. Continue medication. Check lipid profile with goal of LDL less than 100 mg/dl. A1c goal is less than 7.0. Last A1c was at goal 6.7. Defer arb/acei sec to low bp's samantha diastolics. May try Jardiance if A1c >7.0.\par \par CHOL- Check lipid profile and liver function tests. Urged low cholesterol diet as well as weight loss through diet and exercise. Continue medications. LDL goal is less than 100 mg/dl. Last LDL was NOT at goal, 156. INSURANCE WILL NOT COVER MED AND SHE CANNOT AFFORD IT ACC TO PT AND HER DTR.\par \par \par \par

## 2020-12-03 NOTE — HISTORY OF PRESENT ILLNESS
[FreeTextEntry1] : wellness exam \par here for f/u of DM, Chol\par CORRECTED METFORMIN DOSE TO ONE METFORMIN DAILY, NOT 4\par  [de-identified] : Diabetes:\par The patient has been diagnosed with diabetes since 2018.  The frequency of monitoring is NA.  Hypoglycemic episodes are not known.  The results of the last hemoglobin A1c's were 6.7 Sept, 6.8 June, 7.0 Jan 2020; 6.6 Oct, 6.7 July, 6.3 Mar 2019;  7.5 Oct 2018.  Side effects of the medications include none. Compliance with the medical regimen has been good.  \par \par Hypercholesterolemia: Last   Sept\par Since the last visit, the patient has no complaints. Major cardiovascular risk factors include hyperlipidemia and diabetes. The LDL goal for this patient is < 100 mg/dl.  Dietary modifications have included a low fat diet.  Exercise modifications have included walking. Responses to the medications have been NA AS NOT ON MED. The patient's adherence to the medication treatment is NA. The side effects of the medication include NA; the patient specifically STATES SHE GETS MYALGIAS WITH STATINS. The patient's weight has CHANGED BY 0

## 2020-12-03 NOTE — HEALTH RISK ASSESSMENT
[Very Good] : ~his/her~  mood as very good [0-5] : 0-5 [Never (0 pts)] : Never (0 points) [No falls in past year] : Patient reported no falls in the past year [0] : 2) Feeling down, depressed, or hopeless: Not at all (0) [Patient reported mammogram was normal] : Patient reported mammogram was normal [Patient reported PAP Smear was normal] : Patient reported PAP Smear was normal [Patient reported colonoscopy was normal] : Patient reported colonoscopy was normal [None] : None [With Family] : lives with family [Unemployed] : unemployed [High School] : high school [] :  [# Of Children ___] : has [unfilled] children [No] : In the past 12 months have you used drugs other than those required for medical reasons? 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

## 2020-12-03 NOTE — REVIEW OF SYSTEMS
[Heartburn] : heartburn [Fever] : no fever [Chills] : no chills [Fatigue] : no fatigue [Hearing Loss] : no hearing loss [Chest Pain] : no chest pain [Orthopnea] : no orthopnea [Shortness Of Breath] : no shortness of breath [Wheezing] : no wheezing [Cough] : no cough [Dyspnea on Exertion] : no dyspnea on exertion [Abdominal Pain] : no abdominal pain [Nausea] : no nausea [Vomiting] : no vomiting [Dysuria] : no dysuria [Hematuria] : no hematuria [Muscle Weakness] : no muscle weakness [Skin Rash] : no skin rash [Headache] : no headache [Dizziness] : no dizziness [Anxiety] : no anxiety [Depression] : no depression [Easy Bleeding] : no easy bleeding [FreeTextEntry3] : last eye exam 1 yr ago

## 2020-12-05 LAB
ALBUMIN SERPL ELPH-MCNC: 4.3 G/DL
ALP BLD-CCNC: 68 U/L
ALT SERPL-CCNC: 15 U/L
ANION GAP SERPL CALC-SCNC: 11 MMOL/L
APPEARANCE: CLEAR
AST SERPL-CCNC: 20 U/L
BASOPHILS # BLD AUTO: 0.05 K/UL
BASOPHILS NFR BLD AUTO: 0.8 %
BILIRUB SERPL-MCNC: 0.6 MG/DL
BILIRUBIN URINE: NEGATIVE
BLOOD URINE: NEGATIVE
BUN SERPL-MCNC: 17 MG/DL
CALCIUM SERPL-MCNC: 9.6 MG/DL
CHLORIDE SERPL-SCNC: 103 MMOL/L
CHOLEST SERPL-MCNC: 258 MG/DL
CO2 SERPL-SCNC: 25 MMOL/L
COLOR: YELLOW
CREAT SERPL-MCNC: 0.61 MG/DL
EOSINOPHIL # BLD AUTO: 0.1 K/UL
EOSINOPHIL NFR BLD AUTO: 1.7 %
ESTIMATED AVERAGE GLUCOSE: 148 MG/DL
GLUCOSE QUALITATIVE U: NEGATIVE
GLUCOSE SERPL-MCNC: 155 MG/DL
HBA1C MFR BLD HPLC: 6.8 %
HCT VFR BLD CALC: 43.1 %
HDLC SERPL-MCNC: 61 MG/DL
HGB BLD-MCNC: 13.6 G/DL
IMM GRANULOCYTES NFR BLD AUTO: 0.3 %
KETONES URINE: NEGATIVE
LDLC SERPL CALC-MCNC: 165 MG/DL
LEUKOCYTE ESTERASE URINE: NEGATIVE
LYMPHOCYTES # BLD AUTO: 2.05 K/UL
LYMPHOCYTES NFR BLD AUTO: 34.7 %
MAN DIFF?: NORMAL
MCHC RBC-ENTMCNC: 29.1 PG
MCHC RBC-ENTMCNC: 31.6 GM/DL
MCV RBC AUTO: 92.3 FL
MONOCYTES # BLD AUTO: 0.53 K/UL
MONOCYTES NFR BLD AUTO: 9 %
NEUTROPHILS # BLD AUTO: 3.16 K/UL
NEUTROPHILS NFR BLD AUTO: 53.5 %
NITRITE URINE: NEGATIVE
NONHDLC SERPL-MCNC: 197 MG/DL
PH URINE: 6
PLATELET # BLD AUTO: 207 K/UL
POTASSIUM SERPL-SCNC: 4.9 MMOL/L
PROT SERPL-MCNC: 7.2 G/DL
PROTEIN URINE: NORMAL
RBC # BLD: 4.67 M/UL
RBC # FLD: 13.6 %
SODIUM SERPL-SCNC: 139 MMOL/L
SPECIFIC GRAVITY URINE: 1.02
TRIGL SERPL-MCNC: 161 MG/DL
TSH SERPL-ACNC: 1 UIU/ML
UROBILINOGEN URINE: NORMAL
WBC # FLD AUTO: 5.91 K/UL

## 2020-12-08 ENCOUNTER — NON-APPOINTMENT (OUTPATIENT)
Age: 65
End: 2020-12-08

## 2020-12-17 ENCOUNTER — RESULT REVIEW (OUTPATIENT)
Age: 65
End: 2020-12-17

## 2020-12-19 ENCOUNTER — APPOINTMENT (OUTPATIENT)
Dept: INTERNAL MEDICINE | Facility: CLINIC | Age: 65
End: 2020-12-19

## 2021-01-08 ENCOUNTER — RESULT REVIEW (OUTPATIENT)
Age: 66
End: 2021-01-08

## 2021-01-28 ENCOUNTER — RESULT REVIEW (OUTPATIENT)
Age: 66
End: 2021-01-28

## 2021-03-06 ENCOUNTER — APPOINTMENT (OUTPATIENT)
Dept: INTERNAL MEDICINE | Facility: CLINIC | Age: 66
End: 2021-03-06
Payer: MEDICARE

## 2021-03-06 VITALS
BODY MASS INDEX: 23.04 KG/M2 | HEART RATE: 80 BPM | WEIGHT: 130 LBS | HEIGHT: 63 IN | DIASTOLIC BLOOD PRESSURE: 70 MMHG | SYSTOLIC BLOOD PRESSURE: 100 MMHG

## 2021-03-06 PROCEDURE — 99214 OFFICE O/P EST MOD 30 MIN: CPT | Mod: 25

## 2021-03-06 PROCEDURE — 36415 COLL VENOUS BLD VENIPUNCTURE: CPT

## 2021-03-06 NOTE — HISTORY OF PRESENT ILLNESS
[FreeTextEntry1] : hyperlipidemia, DM f/u \par Intolerant of statins; Welchol ordered Jan 2020 at 3 tabs daily but family states insurance would not cover\par  [de-identified] : Diabetes:\par The patient has been diagnosed with diabetes since 2018.  The frequency of monitoring is NA.  Hypoglycemic episodes are not known.  The results of the last hemoglobin A1c's were 6.8 Dec,  6.7 Sept, 6.8 Farideh, 7.0 Jan 2020; 6.6 Oct, 6.7 July, 6.3 Mar 2019;  7.5 Oct 2018.  Side effects of the medications include none. Compliance with the medical regimen has been good.  \par \par Hypercholesterolemia: Last   Dec\par Since the last visit, the patient has no complaints. Major cardiovascular risk factors include hyperlipidemia and diabetes. The LDL goal for this patient is < 100 mg/dl.  Dietary modifications have included a low fat diet.  Exercise modifications have included walking. Responses to the medications have been NA AS NOT ON MED. The patient's adherence to the medication treatment is NA. The side effects of the medication include NA; the patient specifically STATES SHE GETS MYALGIAS WITH STATINS. The patient's weight has CHANGED BY -2

## 2021-03-06 NOTE — ASSESSMENT
[FreeTextEntry1] : DM- Check glucose and A1c. Nutrition counseling, as well as eye and foot care, discussed. Continue medication. Check lipid profile with goal of LDL less than 100 mg/dl. A1c goal is less than 7.0. Last A1c was at goal 6.8. Defer arb/acei sec to low bp's samantha diastolics. May try Jardiance if A1c >7.0.\par \par Mixed hyperlipidemia- Check lipid profile and liver function tests. Urged low cholesterol diet as well as weight loss through diet and exercise. Continue medications. LDL goal is less than 100 mg/dl. Last LDL was NOT at goal, 165.Intolerant of statins; Welchol ordered Jan 2020 at 3 tabs daily but family states insurance would not cover. I will try again today at 3 tabs daily to start\par \par \par \par

## 2021-03-06 NOTE — PHYSICAL EXAM
[No JVD] : no jugular venous distention [Normal] : normal rate, regular rhythm, normal S1 and S2 and no murmur heard [No Carotid Bruits] : no carotid bruits [No Edema] : there was no peripheral edema [de-identified] : no xanthelasmas

## 2021-03-08 LAB
ALBUMIN SERPL ELPH-MCNC: 4.2 G/DL
ALP BLD-CCNC: 69 U/L
ALT SERPL-CCNC: 14 U/L
ANION GAP SERPL CALC-SCNC: 11 MMOL/L
AST SERPL-CCNC: 18 U/L
BILIRUB DIRECT SERPL-MCNC: 0.2 MG/DL
BILIRUB INDIRECT SERPL-MCNC: 0.4 MG/DL
BILIRUB SERPL-MCNC: 0.6 MG/DL
BUN SERPL-MCNC: 17 MG/DL
CALCIUM SERPL-MCNC: 9.8 MG/DL
CHLORIDE SERPL-SCNC: 104 MMOL/L
CHOLEST SERPL-MCNC: 213 MG/DL
CO2 SERPL-SCNC: 26 MMOL/L
CREAT SERPL-MCNC: 0.66 MG/DL
ESTIMATED AVERAGE GLUCOSE: 143 MG/DL
GLUCOSE SERPL-MCNC: 142 MG/DL
HBA1C MFR BLD HPLC: 6.6 %
HDLC SERPL-MCNC: 64 MG/DL
LDLC SERPL CALC-MCNC: 131 MG/DL
NONHDLC SERPL-MCNC: 149 MG/DL
POTASSIUM SERPL-SCNC: 4.8 MMOL/L
PROT SERPL-MCNC: 7.2 G/DL
SODIUM SERPL-SCNC: 141 MMOL/L
TRIGL SERPL-MCNC: 90 MG/DL

## 2021-03-09 ENCOUNTER — NON-APPOINTMENT (OUTPATIENT)
Age: 66
End: 2021-03-09

## 2021-06-12 ENCOUNTER — APPOINTMENT (OUTPATIENT)
Dept: INTERNAL MEDICINE | Facility: CLINIC | Age: 66
End: 2021-06-12
Payer: MEDICARE

## 2021-06-12 VITALS
HEIGHT: 63 IN | HEART RATE: 82 BPM | DIASTOLIC BLOOD PRESSURE: 60 MMHG | WEIGHT: 127 LBS | SYSTOLIC BLOOD PRESSURE: 100 MMHG | BODY MASS INDEX: 22.5 KG/M2

## 2021-06-12 PROCEDURE — 99214 OFFICE O/P EST MOD 30 MIN: CPT | Mod: 25

## 2021-06-12 PROCEDURE — 36415 COLL VENOUS BLD VENIPUNCTURE: CPT

## 2021-06-12 NOTE — HISTORY OF PRESENT ILLNESS
[FreeTextEntry1] : hyperlipidemia, DM f/u \par Taking 's atorvastatin sans ill effects!\par  [de-identified] : Diabetes:\par The patient has been diagnosed with diabetes since 2018.  The frequency of monitoring is NA.  Hypoglycemic episodes are not known.  The results of the last hemoglobin A1c's were 6.6 Mar 2021; 6.8 Dec,  6.7 Sept, 6.8 Farideh, 7.0 Jan 2020; 6.6 Oct, 6.7 July, 6.3 Mar 2019;  7.5 Oct 2018.  Side effects of the medications include none. Compliance with the medical regimen has been good.  \par \par Hypercholesterolemia: Last  TG 90 Mar\par Since the last visit, the patient has no complaints. Major cardiovascular risk factors include hyperlipidemia and diabetes. The LDL goal for this patient is < 100 mg/dl.  Dietary modifications have included a low fat diet.  Exercise modifications have included walking. Responses to the medications have been UNKNOWN AS THIS IS FIRST CHECK SINCE RE_STARTING MED. The patient's adherence to the medication treatment is GOOD. The side effects of the medication include NONE; the patient specifically denies MYALGIAS. The patient's weight has CHANGED BY -3 MORE

## 2021-06-12 NOTE — PHYSICAL EXAM
[No JVD] : no jugular venous distention [Normal] : normal rate, regular rhythm, normal S1 and S2 and no murmur heard [No Carotid Bruits] : no carotid bruits [No Edema] : there was no peripheral edema [de-identified] : no xanthelasmas

## 2021-06-12 NOTE — PLAN
[FreeTextEntry1] : Check labs and continue meds \par Re-order atorvastatin 40 mg daily\par Famotidine 20 mg daily\par Return 3 months

## 2021-06-12 NOTE — ASSESSMENT
[FreeTextEntry1] : DM- Check glucose and A1c. Nutrition counseling, as well as eye and foot care, discussed. Continue medication. Check lipid profile with goal of LDL less than 100 mg/dl. A1c goal is less than 7.0. Last A1c was at goal 6.6. Defer arb/acei sec to low bp's samantha diastolics. May try Jardiance if A1c >7.0 but has been doing well.\par \par Mixed hyperlipidemia- Check lipid profile and liver function tests. Urged low cholesterol diet as well as weight loss through diet and exercise. Continue medications. LDL goal is less than 100 mg/dl. Last LDL was NOT at goal,  131 TG 90 Mar. NOW TOLERANT OF STATINS!! \par \par abnormal mammogram- I reviewed [place] records remotely with patient. On Jan. 28 2021, left breast biopsy revealed "Breast tissue showing adenosis, usual ductal hyperplasia, columnar cell change, apocrine metaplasia and microcalcifications." Recommended yearly mammograms\par \par GERD- patient requesting omeprazole however I prefer to start with famotidine 20 mg daily and if need be 40 mg daily prior to trying a proton pump inhibitor. D?w pt and dtr present.\par \par \par \par

## 2021-06-14 LAB
ALBUMIN SERPL ELPH-MCNC: 4.4 G/DL
ALP BLD-CCNC: 73 U/L
ALT SERPL-CCNC: 21 U/L
ANION GAP SERPL CALC-SCNC: 10 MMOL/L
AST SERPL-CCNC: 27 U/L
BASOPHILS # BLD AUTO: 0.05 K/UL
BASOPHILS NFR BLD AUTO: 0.8 %
BILIRUB DIRECT SERPL-MCNC: 0.2 MG/DL
BILIRUB INDIRECT SERPL-MCNC: 0.5 MG/DL
BILIRUB SERPL-MCNC: 0.7 MG/DL
BUN SERPL-MCNC: 14 MG/DL
CALCIUM SERPL-MCNC: 9.8 MG/DL
CHLORIDE SERPL-SCNC: 104 MMOL/L
CHOLEST SERPL-MCNC: 137 MG/DL
CO2 SERPL-SCNC: 27 MMOL/L
CREAT SERPL-MCNC: 0.66 MG/DL
EOSINOPHIL # BLD AUTO: 0.39 K/UL
EOSINOPHIL NFR BLD AUTO: 6.2 %
ESTIMATED AVERAGE GLUCOSE: 137 MG/DL
GLUCOSE SERPL-MCNC: 112 MG/DL
HBA1C MFR BLD HPLC: 6.4 %
HCT VFR BLD CALC: 40.9 %
HDLC SERPL-MCNC: 65 MG/DL
HGB BLD-MCNC: 12.6 G/DL
IMM GRANULOCYTES NFR BLD AUTO: 0.3 %
LDLC SERPL CALC-MCNC: 57 MG/DL
LYMPHOCYTES # BLD AUTO: 1.9 K/UL
LYMPHOCYTES NFR BLD AUTO: 30.4 %
MAN DIFF?: NORMAL
MCHC RBC-ENTMCNC: 28.9 PG
MCHC RBC-ENTMCNC: 30.8 GM/DL
MCV RBC AUTO: 93.8 FL
MONOCYTES # BLD AUTO: 0.66 K/UL
MONOCYTES NFR BLD AUTO: 10.6 %
NEUTROPHILS # BLD AUTO: 3.23 K/UL
NEUTROPHILS NFR BLD AUTO: 51.7 %
NONHDLC SERPL-MCNC: 72 MG/DL
PLATELET # BLD AUTO: 231 K/UL
POTASSIUM SERPL-SCNC: 4.6 MMOL/L
PROT SERPL-MCNC: 7 G/DL
RBC # BLD: 4.36 M/UL
RBC # FLD: 14.2 %
SODIUM SERPL-SCNC: 141 MMOL/L
TRIGL SERPL-MCNC: 75 MG/DL
WBC # FLD AUTO: 6.25 K/UL

## 2021-09-11 ENCOUNTER — APPOINTMENT (OUTPATIENT)
Dept: INTERNAL MEDICINE | Facility: CLINIC | Age: 66
End: 2021-09-11
Payer: MEDICARE

## 2021-09-11 VITALS
HEART RATE: 76 BPM | SYSTOLIC BLOOD PRESSURE: 116 MMHG | HEIGHT: 63 IN | WEIGHT: 124 LBS | DIASTOLIC BLOOD PRESSURE: 60 MMHG | BODY MASS INDEX: 21.97 KG/M2

## 2021-09-11 DIAGNOSIS — R63.4 ABNORMAL WEIGHT LOSS: ICD-10-CM

## 2021-09-11 PROCEDURE — 99214 OFFICE O/P EST MOD 30 MIN: CPT | Mod: 25

## 2021-09-11 PROCEDURE — 90662 IIV NO PRSV INCREASED AG IM: CPT

## 2021-09-11 PROCEDURE — 36415 COLL VENOUS BLD VENIPUNCTURE: CPT

## 2021-09-11 PROCEDURE — G0008: CPT

## 2021-09-11 NOTE — PHYSICAL EXAM
[No JVD] : no jugular venous distention [Normal] : normal rate, regular rhythm, normal S1 and S2 and no murmur heard [No Carotid Bruits] : no carotid bruits [No Edema] : there was no peripheral edema [de-identified] : no xanthelasmas

## 2021-09-11 NOTE — HISTORY OF PRESENT ILLNESS
[FreeTextEntry1] : here for hyperlipidemia, DM f/u \par \par  [de-identified] : Diabetes:\par The patient has been diagnosed with diabetes since 2018.  The frequency of monitoring is NA.  Hypoglycemic episodes are not known.  The results of the last hemoglobin A1c's were 6.4 Farideh, 6.6 Mar 2021; 6.8 Dec,  6.7 Sept, 6.8 Farideh, 7.0 Jan 2020; 6.6 Oct, 6.7 July, 6.3 Mar 2019;  7.5 Oct 2018.  Side effects of the medications include none. Compliance with the medical regimen has been good.  \par \par Hypercholesterolemia: Last LDL 57 TG 75 June\par Since the last visit, the patient has no complaints. Major cardiovascular risk factors include hyperlipidemia and diabetes. The LDL goal for this patient is < 100 mg/dl.  Dietary modifications have included a low fat diet.  Exercise modifications have included walking. Responses to the medications have been UNKNOWN AS THIS IS FIRST CHECK SINCE RE_STARTING MED. The patient's adherence to the medication treatment is GOOD. The side effects of the medication include NONE; the patient specifically denies MYALGIAS. The patient's weight has CHANGED BY -3 MORE

## 2021-09-11 NOTE — ASSESSMENT
[FreeTextEntry1] : DM- Check glucose and A1c. Nutrition counseling, as well as eye and foot care, discussed. Continue medication. Check lipid profile with goal of LDL less than 100 mg/dl. A1c goal is less than 7.0. Last A1c was at goal 6.4.  May try Jardiance if A1c >7.0 but has been doing well.\par \par Mixed hyperlipidemia- Check lipid profile and liver function tests. Urged low cholesterol diet as well as weight loss through diet and exercise. Continue medications. LDL goal is less than 100 mg/dl. Last LDL was at goal,  57 TG 75 June\par \par GERD- patient requesting omeprazole however I prefer to start with famotidine 20 mg daily and if need be 40 mg daily prior to trying a proton pump inhibitor. D?w pt and dtr present.\par \par weight loss- I note she has lost about 1 pound per month last 15 months. While this rate of weight loss is not alarming, it needs to be monitored. I suspect it may be due to metformin. she has recently had mammograms and is not due for colonoscopy till next year. Her labs have remained unremarkable. Follow for now.\par \par Risks/benefits of flu vaccine discussed with patient and patient understands and accepts.\par \par \par \par

## 2021-09-13 LAB
ALBUMIN SERPL ELPH-MCNC: 4.3 G/DL
ALP BLD-CCNC: 75 U/L
ALT SERPL-CCNC: 22 U/L
ANION GAP SERPL CALC-SCNC: 12 MMOL/L
AST SERPL-CCNC: 24 U/L
BASOPHILS # BLD AUTO: 0.07 K/UL
BASOPHILS NFR BLD AUTO: 0.9 %
BILIRUB DIRECT SERPL-MCNC: 0.2 MG/DL
BILIRUB INDIRECT SERPL-MCNC: 0.6 MG/DL
BILIRUB SERPL-MCNC: 0.8 MG/DL
BUN SERPL-MCNC: 14 MG/DL
CALCIUM SERPL-MCNC: 9.6 MG/DL
CHLORIDE SERPL-SCNC: 105 MMOL/L
CHOLEST SERPL-MCNC: 145 MG/DL
CO2 SERPL-SCNC: 24 MMOL/L
CREAT SERPL-MCNC: 0.62 MG/DL
EOSINOPHIL # BLD AUTO: 0.24 K/UL
EOSINOPHIL NFR BLD AUTO: 3.1 %
ESTIMATED AVERAGE GLUCOSE: 131 MG/DL
GLUCOSE SERPL-MCNC: 107 MG/DL
HBA1C MFR BLD HPLC: 6.2 %
HCT VFR BLD CALC: 41.9 %
HDLC SERPL-MCNC: 71 MG/DL
HGB BLD-MCNC: 12.6 G/DL
IMM GRANULOCYTES NFR BLD AUTO: 0.3 %
LDLC SERPL CALC-MCNC: 57 MG/DL
LYMPHOCYTES # BLD AUTO: 2.29 K/UL
LYMPHOCYTES NFR BLD AUTO: 29.5 %
MAN DIFF?: NORMAL
MCHC RBC-ENTMCNC: 28.9 PG
MCHC RBC-ENTMCNC: 30.1 GM/DL
MCV RBC AUTO: 96.1 FL
MONOCYTES # BLD AUTO: 0.65 K/UL
MONOCYTES NFR BLD AUTO: 8.4 %
NEUTROPHILS # BLD AUTO: 4.49 K/UL
NEUTROPHILS NFR BLD AUTO: 57.8 %
NONHDLC SERPL-MCNC: 74 MG/DL
PLATELET # BLD AUTO: 237 K/UL
POTASSIUM SERPL-SCNC: 4.9 MMOL/L
PROT SERPL-MCNC: 6.8 G/DL
RBC # BLD: 4.36 M/UL
RBC # FLD: 14.5 %
SODIUM SERPL-SCNC: 141 MMOL/L
TRIGL SERPL-MCNC: 83 MG/DL
WBC # FLD AUTO: 7.76 K/UL

## 2022-01-10 ENCOUNTER — APPOINTMENT (OUTPATIENT)
Dept: INTERNAL MEDICINE | Facility: CLINIC | Age: 67
End: 2022-01-10

## 2022-02-01 ENCOUNTER — APPOINTMENT (OUTPATIENT)
Dept: FAMILY MEDICINE | Facility: CLINIC | Age: 67
End: 2022-02-01
Payer: MEDICARE

## 2022-02-01 VITALS
DIASTOLIC BLOOD PRESSURE: 60 MMHG | SYSTOLIC BLOOD PRESSURE: 96 MMHG | RESPIRATION RATE: 16 BRPM | BODY MASS INDEX: 21.82 KG/M2 | WEIGHT: 123.13 LBS | TEMPERATURE: 98.4 F | HEIGHT: 63 IN | HEART RATE: 83 BPM | OXYGEN SATURATION: 99 %

## 2022-02-01 DIAGNOSIS — D12.3 BENIGN NEOPLASM OF TRANSVERSE COLON: ICD-10-CM

## 2022-02-01 PROCEDURE — G0442 ANNUAL ALCOHOL SCREEN 15 MIN: CPT | Mod: 59

## 2022-02-01 PROCEDURE — 36415 COLL VENOUS BLD VENIPUNCTURE: CPT

## 2022-02-01 PROCEDURE — G0439: CPT

## 2022-02-01 PROCEDURE — G0444 DEPRESSION SCREEN ANNUAL: CPT | Mod: 59

## 2022-02-03 LAB
25(OH)D3 SERPL-MCNC: 35.7 NG/ML
APPEARANCE: CLEAR
BACTERIA UR CULT: NORMAL
BACTERIA: NEGATIVE
BASOPHILS # BLD AUTO: 0.06 K/UL
BASOPHILS NFR BLD AUTO: 0.6 %
BILIRUBIN URINE: NEGATIVE
BLOOD URINE: NEGATIVE
CHOLEST SERPL-MCNC: 145 MG/DL
COLOR: YELLOW
CREAT SPEC-SCNC: 103 MG/DL
EOSINOPHIL # BLD AUTO: 0.21 K/UL
EOSINOPHIL NFR BLD AUTO: 2 %
ESTIMATED AVERAGE GLUCOSE: 131 MG/DL
GLUCOSE QUALITATIVE U: NEGATIVE
HAV IGM SER QL: NONREACTIVE
HBA1C MFR BLD HPLC: 6.2 %
HBV SURFACE AB SER QL: REACTIVE
HBV SURFACE AG SER QL: NONREACTIVE
HCT VFR BLD CALC: 40.6 %
HCV AB SER QL: NONREACTIVE
HCV S/CO RATIO: 0.12 S/CO
HDLC SERPL-MCNC: 76 MG/DL
HEPATITIS A IGG ANTIBODY: REACTIVE
HGB BLD-MCNC: 12.8 G/DL
HIV1+2 AB SPEC QL IA.RAPID: NONREACTIVE
HYALINE CASTS: 0 /LPF
IMM GRANULOCYTES NFR BLD AUTO: 0.2 %
KETONES URINE: NEGATIVE
LDLC SERPL CALC-MCNC: 52 MG/DL
LEUKOCYTE ESTERASE URINE: ABNORMAL
LYMPHOCYTES # BLD AUTO: 3.63 K/UL
LYMPHOCYTES NFR BLD AUTO: 35.1 %
MAN DIFF?: NORMAL
MCHC RBC-ENTMCNC: 29.2 PG
MCHC RBC-ENTMCNC: 31.5 GM/DL
MCV RBC AUTO: 92.7 FL
MICROALBUMIN 24H UR DL<=1MG/L-MCNC: <1.2 MG/DL
MICROALBUMIN/CREAT 24H UR-RTO: NORMAL MG/G
MICROSCOPIC-UA: NORMAL
MONOCYTES # BLD AUTO: 0.88 K/UL
MONOCYTES NFR BLD AUTO: 8.5 %
NEUTROPHILS # BLD AUTO: 5.54 K/UL
NEUTROPHILS NFR BLD AUTO: 53.6 %
NITRITE URINE: NEGATIVE
NONHDLC SERPL-MCNC: 69 MG/DL
PH URINE: 6.5
PLATELET # BLD AUTO: 227 K/UL
PROTEIN URINE: NEGATIVE
RBC # BLD: 4.38 M/UL
RBC # FLD: 13.1 %
RED BLOOD CELLS URINE: 4 /HPF
SPECIFIC GRAVITY URINE: 1.02
SQUAMOUS EPITHELIAL CELLS: 1 /HPF
T4 FREE SERPL-MCNC: 1.3 NG/DL
TRIGL SERPL-MCNC: 82 MG/DL
TSH SERPL-ACNC: 1.13 UIU/ML
UROBILINOGEN URINE: NORMAL
WBC # FLD AUTO: 10.34 K/UL
WHITE BLOOD CELLS URINE: 2 /HPF

## 2022-02-04 NOTE — PLAN
[FreeTextEntry1] : 67 yo F with hx of well controlled DM2, presenting for annual physical\par - screening labs drawn in office today\par - DEXA scan ordered; pt prefers to self discontinue alendronate at this time; has been forgetting to take it in the past\par - will call with results\par - 6 month follow up for DM2 per patient preference

## 2022-02-04 NOTE — HISTORY OF PRESENT ILLNESS
[FreeTextEntry1] : Annual physical [de-identified] : Patient is a 67 yo F, Martiniquais speaking, here today with her daughter to establish care with new PCP and for annual physical. Patient has hx of DM2, well controlled on oral medications and diet controlled. Patient reports no acute complaints at this time. Denies headaches, chest pain, shortness of breath, nausea/vomitng, diarrhea. Denies polyuria, or polydypsia. Needs repeat DEXA scan at this time.

## 2022-02-04 NOTE — HEALTH RISK ASSESSMENT
[Never] : Never [Yes] : Yes [Monthly or less (1 pt)] : Monthly or less (1 point) [1 or 2 (0 pts)] : 1 or 2 (0 points) [Never (0 pts)] : Never (0 points) [No] : In the past 12 months have you used drugs other than those required for medical reasons? No [No falls in past year] : Patient reported no falls in the past year [0] : 2) Feeling down, depressed, or hopeless: Not at all (0) [PHQ-2 Negative - No further assessment needed] : PHQ-2 Negative - No further assessment needed [Audit-CScore] : 1 [de-identified] : walking [de-identified] : varied [[id number]] : 0 [Patient reported mammogram was normal] : Patient reported mammogram was normal [Change in mental status noted] : No change in mental status noted [Cultural] : cultural [With Family] : lives with family [Retired] : retired [Feels Safe at Home] : Feels safe at home [Fully functional (bathing, dressing, toileting, transferring, walking, feeding)] : Fully functional (bathing, dressing, toileting, transferring, walking, feeding) [Fully functional (using the telephone, shopping, preparing meals, housekeeping, doing laundry, using] : Fully functional and needs no help or supervision to perform IADLs (using the telephone, shopping, preparing meals, housekeeping, doing laundry, using transportation, managing medications and managing finances) [MammogramComments] : q 2 years [PapSmearComments] : declines [BoneDensityComments] : due, hx of osteoporosis [ColonoscopyComments] : overdue

## 2022-02-08 RX ORDER — LANCETS
EACH MISCELLANEOUS
Qty: 1 | Refills: 3 | Status: ACTIVE | COMMUNITY
Start: 2021-09-14 | End: 1900-01-01

## 2022-03-02 ENCOUNTER — APPOINTMENT (OUTPATIENT)
Dept: INTERNAL MEDICINE | Facility: CLINIC | Age: 67
End: 2022-03-02

## 2022-09-09 ENCOUNTER — APPOINTMENT (OUTPATIENT)
Dept: FAMILY MEDICINE | Facility: CLINIC | Age: 67
End: 2022-09-09

## 2022-09-09 VITALS
SYSTOLIC BLOOD PRESSURE: 110 MMHG | OXYGEN SATURATION: 100 % | HEART RATE: 64 BPM | HEIGHT: 63 IN | WEIGHT: 127 LBS | TEMPERATURE: 98.1 F | DIASTOLIC BLOOD PRESSURE: 60 MMHG | BODY MASS INDEX: 22.5 KG/M2

## 2022-09-09 DIAGNOSIS — Z12.31 ENCOUNTER FOR SCREENING MAMMOGRAM FOR MALIGNANT NEOPLASM OF BREAST: ICD-10-CM

## 2022-09-09 PROCEDURE — 36415 COLL VENOUS BLD VENIPUNCTURE: CPT

## 2022-09-09 PROCEDURE — 99214 OFFICE O/P EST MOD 30 MIN: CPT | Mod: 25

## 2022-09-11 NOTE — PLAN
[FreeTextEntry1] : 68 yo F here for follow up\par - labs drawn in office\par - all questions answered\par - overdue for mammo; ordered provided\par - follow up 6 months for annual physical

## 2022-10-07 ENCOUNTER — APPOINTMENT (OUTPATIENT)
Dept: PODIATRY | Facility: CLINIC | Age: 67
End: 2022-10-07

## 2022-10-10 LAB
ALBUMIN SERPL ELPH-MCNC: 4.1 G/DL
ALP BLD-CCNC: 70 U/L
ALT SERPL-CCNC: 22 U/L
ANION GAP SERPL CALC-SCNC: 8 MMOL/L
AST SERPL-CCNC: 26 U/L
BILIRUB SERPL-MCNC: 0.3 MG/DL
BUN SERPL-MCNC: 21 MG/DL
CALCIUM SERPL-MCNC: 9.9 MG/DL
CHLORIDE SERPL-SCNC: 103 MMOL/L
CO2 SERPL-SCNC: 28 MMOL/L
CREAT SERPL-MCNC: 0.58 MG/DL
EGFR: 99 ML/MIN/1.73M2
ESTIMATED AVERAGE GLUCOSE: 134 MG/DL
GLUCOSE SERPL-MCNC: 137 MG/DL
HBA1C MFR BLD HPLC: 6.3 %
POTASSIUM SERPL-SCNC: 4.7 MMOL/L
PROT SERPL-MCNC: 7.1 G/DL
SODIUM SERPL-SCNC: 139 MMOL/L

## 2022-11-04 ENCOUNTER — APPOINTMENT (OUTPATIENT)
Dept: PAIN MANAGEMENT | Facility: CLINIC | Age: 67
End: 2022-11-04

## 2022-11-04 VITALS
SYSTOLIC BLOOD PRESSURE: 104 MMHG | HEIGHT: 63 IN | BODY MASS INDEX: 22.5 KG/M2 | DIASTOLIC BLOOD PRESSURE: 67 MMHG | TEMPERATURE: 98 F | WEIGHT: 127 LBS

## 2022-11-04 PROCEDURE — 99204 OFFICE O/P NEW MOD 45 MIN: CPT

## 2022-11-04 NOTE — HISTORY OF PRESENT ILLNESS
[7] : 3. What number best describes how, during the past week, pain has interfered with your general activity? 7/10 pain [Back Pain] : back pain [___ wks] : [unfilled] week(s) ago [Constant] : constant [9] : a maximum pain level of 9/10 [Aching] : aching [Sitting] : sitting [Standing] : standing [Walking] : walking [Medications] : medications [Ice] : ice [FreeTextEntry1] : HPI\par \par  \par \par Ms. NERY WOMACK is a 67 year F on baby ASA with pmhx of DM2, osteoporosis (On Fosamax), and chronic lower back pain. Denies any recent trauma. Exacerbation of pain that radiates posterior laterally to legs bilaterally. Pain constant on relieved with ibuprofen. Was in ED Tues. Denies any additional weakness, numbness, bowel/bladder dysfunction.\par \par Previous and current pain medications/doses/effects: Motrin, Oxycodone, Flexeril\par  \par \par Previous Pain Treatments:\par \par  \par \par Previous Pain Injections: yrs ago in [place]\par \par  \par \par Previous Diagnostic Studies/Images:\par \par  na\par \par \par \par  [FreeTextEntry2] : 20

## 2022-11-04 NOTE — ASSESSMENT
[FreeTextEntry1] : >> Imaging and Other Studies\par \par back and leg pain likely secondary to lumbar radiculopathy and discogenic pain refractory to conservative treatments including 6 consecutive weeks of home exercises/PT, will obtain MRI LS to evaluate for pathology\par \par may consider PT vs intervention pending eval\par \par >> Therapy and Other Modalities\par \par exercises\par \par >> Medications\par \par acetaminophen 650mg q8h prn pain (caution <3g daily)\par  \par trial gabapentin uptitrate to TID\par cautioned change in mood.  Encouraged to call with any worsening mood or depression/suicidal ideations\par \par >> Interventions\par \par na\par \par >> Consults\par \par \par >> Discussion of Risks/Benefits/Alternatives\par \par 	>Regarding any scheduled procedures:\par \par I have discussed in detail with the patient that any interventional pain procedure is associated with potential risks.  The procedure may include an injection of steroids and potentially other medications (local anesthetic and normal saline) into the epidural space or surrounding tissue of the spine.  There are significant risks of this procedure which include and are not limited to infection, bleeding, worsening pain, dural puncture leading to postdural puncture headache, nerve damage, spinal cord injury, paralysis, stroke, and death.  \par \par There is a chance that the procedure does not improve their pain.  \par \par There are risks associated with the steroid being absorbed into the body systemically.  These include dysphoria, difficulty sleeping, mood swings and personality changes.  Premenopausal women may notice an irregularity in her menstrual cycle for 2-3 months following the injection.  Steroids can specifically affect patients with hypertension, diabetes, and peptic ulcers.  The procedure may cause a temporary increase in blood pressure and blood pressure, and may adversely affect a peptic ulcer.  Other, more rare complications, include avascular necrosis of joints, glaucoma and worsening of osteoporosis. \par \par I have discussed the risks of the procedure at length with the patient, and the potential benefits of pain relief.  I have offered alternatives to the procedure.  All questions were answered.  \par \par The patient expressed understanding and wishes to proceed with the procedure.\par \par 	>Regarding COVID19 Pandemic: \par \par Any planned interventional pain procedure are scheduled because further delay may cause harm or negative outcome to patient.  The goal in performing this procedure is to avoid deterioration of function, emergency room visits (which increases exposure) and reliance on opioids.  \par \par r/b/a discussed with patient, lack of evidence to conclusively determine whether pain management procedures have any positive or negative impact on the possibility of simon the virus and/or development of any sequelae. \par \par Patient counselled regarding timing steroid based intervention 2 weeks before or after COVID-19 vaccine administration to avoid any interaction or affect on efficacy of vaccination\par \par Patient demonstrates understanding\par \par Informed patient that risks associated with the COVID-19 infection.  Informed patient steps taken to limit the risks.  We are implementing safety precautions and following protocols consistent with the CDC and state recommendations. All patients and staff will be checked for fever or signs of illness upon entry to the facility. We will limit our steroid dose to the lowest effective therapeutic dose or in some cases steroids will not be injected at all. \par \par Patient agrees to proceed\par \par >> Conclusion\par \par The above diagnosis and treatment plan is medically reasonable and necessary based on the patient encounter \par There were no barriers to communication.\par Informed patient that I would be available for any additional questions.\par Patient was instructed to call with any worsening symptoms including severe pain, new numbness/weakness, or changes in the bowel/bladder function. \par Discussed role of nsaids in pain management and all relevant risks, if patient is continuing to require after 4 weeks the patient should f/u for alternative treatment. \par Instructed patient to maintain pain diary to monitor pain level, mobility, and function.\par \par The referring provider was informed of the above diagnosis and treatment plan.\par

## 2022-11-04 NOTE — REASON FOR VISIT
[Initial Consultation] : an initial pain management consultation [FreeTextEntry2] : Patient was in the ED

## 2022-11-19 ENCOUNTER — RESULT REVIEW (OUTPATIENT)
Age: 67
End: 2022-11-19

## 2022-11-19 ENCOUNTER — APPOINTMENT (OUTPATIENT)
Dept: PODIATRY | Facility: CLINIC | Age: 67
End: 2022-11-19

## 2022-11-19 ENCOUNTER — NON-APPOINTMENT (OUTPATIENT)
Age: 67
End: 2022-11-19

## 2022-11-19 VITALS
HEART RATE: 78 BPM | BODY MASS INDEX: 22.5 KG/M2 | WEIGHT: 127 LBS | DIASTOLIC BLOOD PRESSURE: 71 MMHG | OXYGEN SATURATION: 100 % | HEIGHT: 63 IN | SYSTOLIC BLOOD PRESSURE: 111 MMHG

## 2022-11-19 PROCEDURE — 99203 OFFICE O/P NEW LOW 30 MIN: CPT | Mod: 25

## 2022-11-19 PROCEDURE — 11750 EXCISION NAIL&NAIL MATRIX: CPT | Mod: TA

## 2022-11-19 NOTE — HISTORY OF PRESENT ILLNESS
[FreeTextEntry1] : Location- both borders of both GTN\par Duration- "my whole life' \par Past tx- self tx, slant back, and her  had "permanent removal and I want it too"\par

## 2022-11-19 NOTE — PROCEDURE
[FreeTextEntry1] : I had a lengthy discussion with the patient regarding the way they should be cutting a nail in an effort to help prevent recurrence of this problem. I also revised self treatment should not be attempted and should there be any redness or pain on the side of the nail rather than treating it themselves they should call the office to make a follow up.\par \par I had a lengthy discussion with the patient from a medical assistant regarding treatment options for ingrown nails. They ranged from conservative to surgical. Conservative measures included simple nail care on a periodic basis with removal of all offending spicule and periodic care every time when the problem recurs. I also explained a nonpermanent removal in which Novocain would be used a portion of the old offending portion of nail would be removed and allowed to grow back in and monitor the situation. I also explained permanent removal in the form of cold steel procedures as well as the phenolization. I explained risks alternatives and benefits to all types of conservative as well as surgical approaches. Surgical approaches can be done under local anesthesia and the risks and benefits of those were stressed in a lengthier discussion. Some of the risks included but were not limited to infection recurrence of deformity chronic pain to the area recurrence of nail growth either partially or totally and the condition could actually become worse. I explained to the patient that they would need oral as well as topical antibiotics and should they get a postop infection that is not handled by oral antibiotics and quite possibly hospitalization with intravenous antibiotics would be necessary. I also explained in some rare and unusual cases and infection could become so advanced that it involves the bone. At that point surgery to remove the bone and possibly part or all of the toe could occur.\par \par Left GTN lateral side:\par After risks, alternatives and benefits were explained to the patient and verbal consent obtained, the surgical site on the affected toe was cleansed with alcohol, painted with Betadine and injected with 3 cc's of 1 percent lidocaine plain, Next using sterile instrumentation a partial nail plate avulsion was done to the affected border and next a phenol swab was inserted to the area for 60 seconds, The surgical site was then irrigated with copious amounts of alcohol and a dry sterile bandage was applied using Silvadene , The patient received written and oral discharge instructions as well as postoperative aftercare orders reviewed and understood. All questions asked and answered appropriately.\par \par A lengthy discussion with the patient regarding the current medication being prescribed, the dosage, frequency, and the need to be consistent with taking the medication at or about the same time every day. I also spent time reviewing the risks alternatives and benefits to the medication as well as the potential side effects. The decision was made to proceed with the medication\par follow up appt 2 weeks\par \par

## 2022-11-19 NOTE — REVIEW OF SYSTEMS
[As Noted in HPI] : as noted in HPI [Negative] : Heme/Lymph [Leg Claudication] : no intermittent leg claudication [Lower Ext Edema] : no lower extremity edema

## 2022-11-19 NOTE — REASON FOR VISIT
[Initial Visit] : an initial visit for [Ingrown Nail] : ingrown nail [FreeTextEntry2] : BOTH GT BOTH BORDERS

## 2022-11-19 NOTE — PHYSICAL EXAM
[General Appearance - In No Acute Distress] : in no acute distress [General Appearance - Alert] : alert [No Joint Swelling] : no joint swelling [] : normal strength/tone [Normal Foot/Ankle] : Both lower extremities were exposed and visualized. Standing exam demonstrates normal foot posture and alignment. Hindfoot exam shows no hindfoot valgus or varus [Sensation] : the sensory exam was normal to light touch and pinprick [No Focal Deficits] : no focal deficits [Deep Tendon Reflexes (DTR)] : deep tendon reflexes were 2+ and symmetric [Motor Exam] : the motor exam was normal [Oriented To Time, Place, And Person] : oriented to person, place, and time [Impaired Insight] : insight and judgment were intact [Affect] : the affect was normal [FreeTextEntry3] : Vascular exam reveals palpable pedal pulses, the foot is warm to touch, there was good capillary fill time, the skin is normal in appearance there is no evidence of vascular disease or compromise at this time [de-identified] : overall muscle strength testing is normal, ROM to ankle, mid tarsal, MTP joints all WNL and w/out crepitus or jamming. No atrophy and overall weakness noted.\par \par  [FreeTextEntry1] : Evaluation of the area of chief complaint reveals a mild noninfected but reddened distal portion of the afffected nail. There is no discharge or drainage there is no sign of infectious process signs and symptoms are consistent with the noninfected ingrown nail to all GTN\par

## 2022-11-21 ENCOUNTER — APPOINTMENT (OUTPATIENT)
Dept: FAMILY MEDICINE | Facility: CLINIC | Age: 67
End: 2022-11-21

## 2022-11-21 ENCOUNTER — NON-APPOINTMENT (OUTPATIENT)
Age: 67
End: 2022-11-21

## 2022-11-21 VITALS
HEART RATE: 81 BPM | HEIGHT: 63 IN | OXYGEN SATURATION: 99 % | SYSTOLIC BLOOD PRESSURE: 116 MMHG | WEIGHT: 125 LBS | BODY MASS INDEX: 22.15 KG/M2 | DIASTOLIC BLOOD PRESSURE: 66 MMHG | TEMPERATURE: 98 F

## 2022-11-21 DIAGNOSIS — H26.9 UNSPECIFIED CATARACT: ICD-10-CM

## 2022-11-21 PROCEDURE — 93000 ELECTROCARDIOGRAM COMPLETE: CPT

## 2022-11-21 PROCEDURE — 99214 OFFICE O/P EST MOD 30 MIN: CPT | Mod: 25

## 2022-11-22 NOTE — ASSESSMENT
[Patient Optimized for Surgery] : Patient optimized for surgery [No Further Testing Recommended] : no further testing recommended [Continue medications as is] : Continue current medications [As per surgery] : as per surgery [FreeTextEntry4] : Patient is medically optimized for right eye cataracts surgery at this time.

## 2022-11-22 NOTE — HISTORY OF PRESENT ILLNESS
[No Pertinent Cardiac History] : no history of aortic stenosis, atrial fibrillation, coronary artery disease, recent myocardial infarction, or implantable device/pacemaker [No Pertinent Pulmonary History] : no history of asthma, COPD, sleep apnea, or smoking [No Adverse Anesthesia Reaction] : no adverse anesthesia reaction in self or family member [Diabetes] : diabetes [(Patient denies any chest pain, claudication, dyspnea on exertion, orthopnea, palpitations or syncope)] : Patient denies any chest pain, claudication, dyspnea on exertion, orthopnea, palpitations or syncope [Moderate (4-6 METs)] : Moderate (4-6 METs) [Chronic Anticoagulation] : no chronic anticoagulation [Chronic Kidney Disease] : no chronic kidney disease [FreeTextEntry1] : right eye cataracts [FreeTextEntry2] : 11/28/22 [FreeTextEntry3] : Dr. Singh [FreeTextEntry4] : 66 yo F with well controlled DM2, HLD, osteoarthritis of knees, here for preoperative clearance for right eye cataracts surgery. Denies any acute concerns. Denies headaches, chest pain, shortness of breath, nausea/vomiting, diarrhea, constipation, fevers, chills, recent illnesses. Denies any complications with anesthesia in the past.

## 2022-11-28 ENCOUNTER — HOSPITAL ENCOUNTER (OUTPATIENT)
Dept: HOSPITAL 74 - FASU | Age: 67
Discharge: HOME | End: 2022-11-28
Attending: STUDENT IN AN ORGANIZED HEALTH CARE EDUCATION/TRAINING PROGRAM
Payer: COMMERCIAL

## 2022-11-28 VITALS — TEMPERATURE: 97.4 F | DIASTOLIC BLOOD PRESSURE: 63 MMHG | SYSTOLIC BLOOD PRESSURE: 122 MMHG | HEART RATE: 80 BPM

## 2022-11-28 VITALS — RESPIRATION RATE: 18 BRPM

## 2022-11-28 VITALS — BODY MASS INDEX: 23.3 KG/M2

## 2022-11-28 PROCEDURE — 66984 XCAPSL CTRC RMVL W/O ECP: CPT

## 2022-11-28 PROCEDURE — 08RJ3JZ REPLACEMENT OF RIGHT LENS WITH SYNTHETIC SUBSTITUTE, PERCUTANEOUS APPROACH: ICD-10-PCS | Performed by: STUDENT IN AN ORGANIZED HEALTH CARE EDUCATION/TRAINING PROGRAM

## 2022-11-28 RX ADMIN — TROPICAMIDE SCH DROP: 10 SOLUTION/ DROPS OPHTHALMIC at 07:30

## 2022-11-28 RX ADMIN — OFLOXACIN SCH DROP: 3 SOLUTION/ DROPS OPHTHALMIC at 07:25

## 2022-11-28 RX ADMIN — TROPICAMIDE SCH DROP: 10 SOLUTION/ DROPS OPHTHALMIC at 07:25

## 2022-11-28 RX ADMIN — CYCLOPENTOLATE HYDROCHLORIDE SCH DROP: 10 SOLUTION/ DROPS OPHTHALMIC at 07:25

## 2022-11-28 RX ADMIN — OFLOXACIN SCH DROP: 3 SOLUTION/ DROPS OPHTHALMIC at 07:30

## 2022-11-28 RX ADMIN — CYCLOPENTOLATE HYDROCHLORIDE SCH DROP: 10 SOLUTION/ DROPS OPHTHALMIC at 07:30

## 2022-11-28 RX ADMIN — TROPICAMIDE SCH DROP: 10 SOLUTION/ DROPS OPHTHALMIC at 07:20

## 2022-11-28 RX ADMIN — OFLOXACIN SCH DROP: 3 SOLUTION/ DROPS OPHTHALMIC at 07:20

## 2022-11-28 RX ADMIN — KETOROLAC TROMETHAMINE SCH DROP: 5 SOLUTION OPHTHALMIC at 07:25

## 2022-11-28 RX ADMIN — KETOROLAC TROMETHAMINE SCH DROP: 5 SOLUTION OPHTHALMIC at 07:20

## 2022-11-28 RX ADMIN — KETOROLAC TROMETHAMINE SCH DROP: 5 SOLUTION OPHTHALMIC at 07:30

## 2022-11-28 RX ADMIN — PHENYLEPHRINE HYDROCHLORIDE SCH DROP: 0.25 SPRAY NASAL at 07:30

## 2022-11-28 RX ADMIN — PHENYLEPHRINE HYDROCHLORIDE SCH DROP: 0.25 SPRAY NASAL at 07:25

## 2022-11-28 RX ADMIN — CYCLOPENTOLATE HYDROCHLORIDE SCH DROP: 10 SOLUTION/ DROPS OPHTHALMIC at 07:20

## 2022-11-28 RX ADMIN — PHENYLEPHRINE HYDROCHLORIDE SCH DROP: 0.25 SPRAY NASAL at 07:20

## 2022-12-03 ENCOUNTER — APPOINTMENT (OUTPATIENT)
Dept: PODIATRY | Facility: CLINIC | Age: 67
End: 2022-12-03

## 2022-12-17 ENCOUNTER — APPOINTMENT (OUTPATIENT)
Dept: PODIATRY | Facility: CLINIC | Age: 67
End: 2022-12-17

## 2022-12-17 PROCEDURE — 11750 EXCISION NAIL&NAIL MATRIX: CPT | Mod: T5

## 2022-12-17 PROCEDURE — 99213 OFFICE O/P EST LOW 20 MIN: CPT | Mod: 25

## 2022-12-17 NOTE — REASON FOR VISIT
[Ingrown Nail] : ingrown nail [Follow-Up Visit] : a follow-up visit for [FreeTextEntry2] : BOTH GT BOTH BORDERS

## 2022-12-17 NOTE — PROCEDURE
[FreeTextEntry1] : Once again, similar to last visit, I had a lengthy discussion with the patient from a medical assistant regarding treatment options for ingrown nails. They ranged from conservative to surgical. Conservative measures included simple nail care on a periodic basis with removal of all offending spicule and periodic care every time when the problem recurs. I also explained a nonpermanent removal in which Novocain would be used a portion of the old offending portion of nail would be removed and allowed to grow back in and monitor the situation. I also explained permanent removal in the form of cold steel procedures as well as the phenolization. I explained risks alternatives and benefits to all types of conservative as well as surgical approaches. Surgical approaches can be done under local anesthesia and the risks and benefits of those were stressed in a lengthier discussion. Some of the risks included but were not limited to infection recurrence of deformity chronic pain to the area recurrence of nail growth either partially or totally and the condition could actually become worse. I explained to the patient that they would need oral as well as topical antibiotics and should they get a postop infection that is not handled by oral antibiotics and quite possibly hospitalization with intravenous antibiotics would be necessary. I also explained in some rare and unusual cases and infection could become so advanced that it involves the bone. At that point surgery to remove the bone and possibly part or all of the toe could occur.\par Right  GTN lateral side:\par After risks, alternatives and benefits were explained to the patient and verbal consent obtained, the surgical site on the affected toe was cleansed with alcohol, painted with Betadine and injected with 3 cc's of 1 percent lidocaine plain, Next using sterile instrumentation a partial nail plate avulsion was done to the affected border and next a phenol swab was inserted to the area for 60 seconds, The surgical site was then irrigated with copious amounts of alcohol and a dry sterile bandage was applied using Silvadene , The patient received written and oral discharge instructions as well as postoperative aftercare orders reviewed and understood. All questions asked and answered appropriately.\par A lengthy discussion with the patient regarding the current medication being prescribed, the dosage, frequency, and the need to be consistent with taking the medication at or about the same time every day. I also spent time reviewing the risks alternatives and benefits to the medication as well as the potential side effects. The decision was made to proceed with the medication\par Left lateral:\par Any loose fibrinous debris was removed with a sterile curette to reveal a good healthy nailbed the area was dressed with Silvadene and a dry sterile bandage postop after care and wound care was reviewed with the patient\par \par follow up appt 2 weeks\par \par

## 2022-12-17 NOTE — PHYSICAL EXAM
[General Appearance - Alert] : alert [General Appearance - In No Acute Distress] : in no acute distress [No Joint Swelling] : no joint swelling [] : normal strength/tone [Normal Foot/Ankle] : Both lower extremities were exposed and visualized. Standing exam demonstrates normal foot posture and alignment. Hindfoot exam shows no hindfoot valgus or varus [Sensation] : the sensory exam was normal to light touch and pinprick [No Focal Deficits] : no focal deficits [Deep Tendon Reflexes (DTR)] : deep tendon reflexes were 2+ and symmetric [Motor Exam] : the motor exam was normal [Oriented To Time, Place, And Person] : oriented to person, place, and time [Impaired Insight] : insight and judgment were intact [Affect] : the affect was normal [FreeTextEntry3] : Vascular exam reveals palpable pedal pulses, the foot is warm to touch, there was good capillary fill time, the skin is normal in appearance there is no evidence of vascular disease or compromise at this time [de-identified] : overall muscle strength testing is normal, ROM to ankle, mid tarsal, MTP joints all WNL and w/out crepitus or jamming. No atrophy and overall weakness noted.\par \par  [FreeTextEntry1] : Evaluation of the area of chief complaint (right lateral)  reveals a mild noninfected but reddened distal portion of the afffected nail. There is no discharge or drainage there is no sign of infectious process signs and symptoms are consistent with the noninfected ingrown nail to all GTN\par Post op left lateral :\par no cellulitis, no tunneling, no sinus tract, no drainage, no odor, mild periwound erythema, no evidence of infection\par \par

## 2022-12-17 NOTE — HISTORY OF PRESENT ILLNESS
[FreeTextEntry1] : Location- both borders of both GTN\par s/p P and A left lateral, still c/o some pain and now requests similar to the right lateral side\par Duration- "my whole life' \par Past tx- self tx, slant back, and her  had "permanent removal and I want it too"\par

## 2022-12-20 ENCOUNTER — APPOINTMENT (OUTPATIENT)
Dept: PAIN MANAGEMENT | Facility: CLINIC | Age: 67
End: 2022-12-20

## 2022-12-20 DIAGNOSIS — M79.18 MYALGIA, OTHER SITE: ICD-10-CM

## 2022-12-20 DIAGNOSIS — M47.817 SPONDYLOSIS W/OUT MYELOPATHY OR RADICULOPATHY, LUMBOSACRAL REGION: ICD-10-CM

## 2022-12-20 PROCEDURE — 99214 OFFICE O/P EST MOD 30 MIN: CPT | Mod: 95

## 2022-12-20 NOTE — ASSESSMENT
[FreeTextEntry1] : >> Imaging and Other Studies\par \par I personally reviewed the relevant imaging.  Discussed and explained to patient the likely source of pathology and pain.  Questions answered. MRI\par \par >> Therapy and Other Modalities\par \par exercises\par \par >> Medications\par \par acetaminophen 650mg q8h prn pain (caution <3g daily)\par  \par gabapentin uptitrate to TID\par cautioned change in mood.  Encouraged to call with any worsening mood or depression/suicidal ideations\par \par >> Interventions\par \par Significant component of axial back pain secondary to lumbar spondylosis and facet arthropathy demonstrated on MRI LS.  Pain refractory to conservative treatments.  Will schedule BILATERAL L4-sacral ala diagnostic medial branch block (2 joints, 3 nerves on each side) r/b/a discussed\par \par hold asa 81mg primary prophylaxis for 7 days prior to intervention\par \par May consider radiofreqency ablation\par \par \par >> Consults\par \par referral to Dr. Pearson for evaluation of grade 2 lumbar spondylisthesis\par \par >> Discussion of Risks/Benefits/Alternatives\par \par 	>Regarding any scheduled procedures:\par \par I have discussed in detail with the patient that any interventional pain procedure is associated with potential risks.  The procedure may include an injection of steroids and potentially other medications (local anesthetic and normal saline) into the epidural space or surrounding tissue of the spine.  There are significant risks of this procedure which include and are not limited to infection, bleeding, worsening pain, dural puncture leading to postdural puncture headache, nerve damage, spinal cord injury, paralysis, stroke, and death.  \par \par There is a chance that the procedure does not improve their pain.  \par \par There are risks associated with the steroid being absorbed into the body systemically.  These include dysphoria, difficulty sleeping, mood swings and personality changes.  Premenopausal women may notice an irregularity in her menstrual cycle for 2-3 months following the injection.  Steroids can specifically affect patients with hypertension, diabetes, and peptic ulcers.  The procedure may cause a temporary increase in blood pressure and blood pressure, and may adversely affect a peptic ulcer.  Other, more rare complications, include avascular necrosis of joints, glaucoma and worsening of osteoporosis. \par \par I have discussed the risks of the procedure at length with the patient, and the potential benefits of pain relief.  I have offered alternatives to the procedure.  All questions were answered.  \par \par The patient expressed understanding and wishes to proceed with the procedure.\par \par 	>Regarding COVID19 Pandemic: \par \par Any planned interventional pain procedure are scheduled because further delay may cause harm or negative outcome to patient.  The goal in performing this procedure is to avoid deterioration of function, emergency room visits (which increases exposure) and reliance on opioids.  \par \par r/b/a discussed with patient, lack of evidence to conclusively determine whether pain management procedures have any positive or negative impact on the possibility of simon the virus and/or development of any sequelae. \par \par Patient counselled regarding timing steroid based intervention 2 weeks before or after COVID-19 vaccine administration to avoid any interaction or affect on efficacy of vaccination\par \par Patient demonstrates understanding\par \par Informed patient that risks associated with the COVID-19 infection.  Informed patient steps taken to limit the risks.  We are implementing safety precautions and following protocols consistent with the CDC and state recommendations. All patients and staff will be checked for fever or signs of illness upon entry to the facility. We will limit our steroid dose to the lowest effective therapeutic dose or in some cases steroids will not be injected at all. \par \par Patient agrees to proceed\par \par >> Conclusion\par \par The above diagnosis and treatment plan is medically reasonable and necessary based on the patient encounter \par There were no barriers to communication.\par Informed patient that I would be available for any additional questions.\par Patient was instructed to call with any worsening symptoms including severe pain, new numbness/weakness, or changes in the bowel/bladder function. \par Discussed role of nsaids in pain management and all relevant risks, if patient is continuing to require after 4 weeks the patient should f/u for alternative treatment. \par Instructed patient to maintain pain diary to monitor pain level, mobility, and function.\par \par I explained to patient benefits and limitation of TeleMedicine visits\par \par Patient understands that limitations include inability to perform comprehensive physical exam, which may lead to potential diagnostic inconsistencies.  \par \par Any scheduled procedures are based on history, imaging and limited physical exam performed on TeleHealth visit.  If necessary, additional focal physical exam will be performed on date of procedure\par \par Patient understands that diagnosis and treatment may be limited by these inconsistencies and patient agrees to proceed with care plan\par \par \par

## 2022-12-20 NOTE — HISTORY OF PRESENT ILLNESS
[Home] : at home, [unfilled] , at the time of the visit. [Verbal consent obtained from patient] : the patient, [unfilled] [Back Pain] : back pain [___ wks] : [unfilled] week(s) ago [Constant] : constant [9] : a maximum pain level of 9/10 [Aching] : aching [Sitting] : sitting [Standing] : standing [Walking] : walking [Medications] : medications [Ice] : ice [Other Location: e.g. Home (Enter Location, City,State)___] : at [unfilled] [FreeTextEntry1] : Interval Note:\par \par Patient was provided option of utilizing  services, but patient declined and would like to proceed with visit by using interpretation assistance from family member/staff.\par \par Since last visit the pain is not improved.  Continues to have significant axial back pain, worse with transition.  Pain is so bad that patient finds it difficult to perform adls and ambulate.  Denies any additional weakness, numbness, bowel/bladder dysfunction.\par \par \par \par HPI\par \par  \par \par Ms. NERY WOMACK is a 67 year F on baby ASA with pmhx of DM2, osteoporosis (On Fosamax), and chronic lower back pain. Denies any recent trauma. Exacerbation of pain that radiates posterior laterally to legs bilaterally. Pain constant on relieved with ibuprofen. Was in ED Tues. Denies any additional weakness, numbness, bowel/bladder dysfunction.\par \par Previous and current pain medications/doses/effects: Motrin, Oxycodone, Flexeril\par  \par \par Previous Pain Treatments:\par \par  \par \par Previous Pain Injections: yrs ago in [place]\par \par  \par \par Previous Diagnostic Studies/Images:\par \par MRI LS 11/22\par \par There is grade 2 anterolisthesis of L5 on S1 (approximately 8 to 9 mm), slightly increased when \par compared to the prior study. There are bilateral chronic pars defects, unchanged. \par \par  There is normal curvature to the lumbar lordosis. The vertebral bodies are normal height and \par configuration. There is mild loss of disc height and T2 signal at the L5/S1 disc space consistent \par with desiccation. The remaining intervertebral disc spaces are within normal limits. The conus \par terminates at the T12 level and demonstrates no evidence of abnormal signal changes. \par \par  Evaluation of the individual levels demonstrates at the L5/S1 level there is unroofing of the \par posterior aspect of the disc space due to the anterolisthesis. There is a superimposed disc bulge \par contacting the ventral thecal sac and contacting the bilateral L5 foraminal exiting nerve roots, \par unchanged. There is moderate to severe bilateral foraminal narrowing. There is moderate facet \par hypertrophy. There is no evidence of spinal canal stenosis. \par \par  At the L4/L5 level there is a minimal diffuse disc bulge contacting the ventral thecal sac. The \par bilateral neuroforamen demonstrate mild to moderate foraminal narrowing. There is mild facet \par hypertrophy. There is no evidence of spinal canal stenosis. \par \par  At the L3/L4 level there is a mild diffuse disc bulge with a superimposed mild left foraminal and \par far lateral shallow broad-based disc protrusion, new since the prior study. There is mild moderate \par bilateral foraminal narrowing. The bilateral L3 foraminal exiting nerve roots are within normal \par limits. There is mild to moderate facet and ligamentous hypertrophy. There is no evidence of spinal \par canal stenosis. \par \par  At the L2/L3 level there is a very minimal disc bulge contacting the ventral thecal sac. The \par bilateral neuroforamen are patent. There is no evidence of spinal canal stenosis. \par \par  At the L1/L2 level there is no evidence of a focal disc herniation. The bilateral neuroforamen are \par patent. There is no evidence of spinal canal stenosis. \par \par \par  IMPRESSION: \par \par  Grade 2 anterolisthesis of L5 on S1 with bilateral pars defects, slightly increased when compared \par to the prior study. \par \par  L5/S1 disc bulge contacting the bilateral L5 foraminal exiting nerve roots, unchanged. L5/S1 no \par evidence of spinal canal stenosis. \par \par  L4/L5 is a very minimal disc bulge contacting the ventral thecal sac. L4/L5 a no evidence of spinal\par canal stenosis. \par \par  L3/L4 mild diffuse disc bulge with a superimposed mild left foraminal and far lateral shallow \par broad-based disc protrusion, new since the prior study. L3/L4 no evidence of spinal canal stenosis. \par \par  L2/L3 very minimal disc bulge. L2/L3 no evidence of spinal canal stenosis. \par \par \par \par \par

## 2022-12-20 NOTE — PHYSICAL EXAM
[de-identified] : \par Constitutional: Normal, well developed, no acute distress on audio/video examination\par Eyes: Symmetric, External structures on video examination\par ENT: Lips, mucosa and tongue normal on video examination\par Oropharynx: Lips normal, symmetric, no external lesions appreciated appreciated on video examination\par Respiratory: Non-labored breathing, no audible wheezes appreciated on audio/video examination\par Vascular: No cyanosis appreciated or edema appreciated on video examination\par GI:  no jaundice appreciated on video examination\par Neurovascular: CN grossly intact on video/audio examination, alert\par MSK: Normal muscle bulk on video examination\par

## 2023-01-07 ENCOUNTER — APPOINTMENT (OUTPATIENT)
Dept: PODIATRY | Facility: CLINIC | Age: 68
End: 2023-01-07
Payer: MEDICARE

## 2023-01-07 ENCOUNTER — RESULT REVIEW (OUTPATIENT)
Age: 68
End: 2023-01-07

## 2023-01-07 VITALS
HEART RATE: 81 BPM | HEIGHT: 63 IN | WEIGHT: 125 LBS | SYSTOLIC BLOOD PRESSURE: 117 MMHG | DIASTOLIC BLOOD PRESSURE: 61 MMHG | BODY MASS INDEX: 22.15 KG/M2

## 2023-01-07 DIAGNOSIS — M79.672 PAIN IN LEFT FOOT: ICD-10-CM

## 2023-01-07 DIAGNOSIS — G89.29 PAIN IN RIGHT FOOT: ICD-10-CM

## 2023-01-07 DIAGNOSIS — L03.031 CELLULITIS OF RIGHT TOE: ICD-10-CM

## 2023-01-07 DIAGNOSIS — L03.032 CELLULITIS OF LEFT TOE: ICD-10-CM

## 2023-01-07 DIAGNOSIS — M79.671 PAIN IN RIGHT FOOT: ICD-10-CM

## 2023-01-07 DIAGNOSIS — G89.29 PAIN IN LEFT FOOT: ICD-10-CM

## 2023-01-07 PROCEDURE — 99213 OFFICE O/P EST LOW 20 MIN: CPT

## 2023-01-07 NOTE — PROCEDURE
[FreeTextEntry1] : currette of the area reveals no spicules\par Any loose fibrinous debris was removed with a sterile curette to reveal a good healthy nailbed the area was dressed with Silvadene and a dry sterile bandage postop after care and wound care was reviewed with the patient\par continue bid dressing changes\par \par Written aftercare and wound orders dispensed and r/w patient. they understand\par follow up appt 1 week if sx persist\par

## 2023-01-07 NOTE — REASON FOR VISIT
[Follow-Up Visit] : a follow-up visit for [Ingrown Nail] : ingrown nail [FreeTextEntry2] : right lateral

## 2023-01-07 NOTE — PHYSICAL EXAM
[FreeTextEntry3] : Vascular exam reveals palpable pedal pulses, the foot is warm to touch, there was good capillary fill time, the skin is normal in appearance there is no evidence of vascular disease or compromise at this time [FreeTextEntry1] : no cellulitis, no tunneling, no sinus tract, no drainage, no odor, mild periwound erythema\par

## 2023-01-07 NOTE — REVIEW OF SYSTEMS
[As Noted in HPI] : as noted in HPI [Fever] : no fever [Chills] : no chills [Leg Claudication] : no intermittent leg claudication [Lower Ext Edema] : no lower extremity edema

## 2023-01-07 NOTE — HISTORY OF PRESENT ILLNESS
[FreeTextEntry1] : Location- both borders of both GTN\par s/p P and A left lateral 100%   right lateral side still red and a bit painful\par

## 2023-01-17 ENCOUNTER — RX RENEWAL (OUTPATIENT)
Age: 68
End: 2023-01-17

## 2023-01-21 ENCOUNTER — APPOINTMENT (OUTPATIENT)
Dept: PODIATRY | Facility: CLINIC | Age: 68
End: 2023-01-21

## 2023-02-13 ENCOUNTER — HOSPITAL ENCOUNTER (OUTPATIENT)
Dept: HOSPITAL 74 - FASU | Age: 68
Discharge: HOME | End: 2023-02-13
Attending: STUDENT IN AN ORGANIZED HEALTH CARE EDUCATION/TRAINING PROGRAM
Payer: COMMERCIAL

## 2023-02-13 VITALS — HEART RATE: 72 BPM | SYSTOLIC BLOOD PRESSURE: 122 MMHG | DIASTOLIC BLOOD PRESSURE: 62 MMHG

## 2023-02-13 VITALS — RESPIRATION RATE: 18 BRPM | TEMPERATURE: 97.4 F

## 2023-02-13 VITALS — BODY MASS INDEX: 23.3 KG/M2

## 2023-02-13 DIAGNOSIS — H25.12: Primary | ICD-10-CM

## 2023-02-13 PROCEDURE — 08RK3JZ REPLACEMENT OF LEFT LENS WITH SYNTHETIC SUBSTITUTE, PERCUTANEOUS APPROACH: ICD-10-PCS | Performed by: STUDENT IN AN ORGANIZED HEALTH CARE EDUCATION/TRAINING PROGRAM

## 2023-02-13 PROCEDURE — 66984 XCAPSL CTRC RMVL W/O ECP: CPT

## 2023-02-13 RX ADMIN — OFLOXACIN SCH DROP: 3 SOLUTION/ DROPS OPHTHALMIC at 09:50

## 2023-02-13 RX ADMIN — KETOROLAC TROMETHAMINE SCH DROP: 5 SOLUTION OPHTHALMIC at 09:40

## 2023-02-13 RX ADMIN — CYCLOPENTOLATE HYDROCHLORIDE SCH DROP: 10 SOLUTION/ DROPS OPHTHALMIC at 09:45

## 2023-02-13 RX ADMIN — KETOROLAC TROMETHAMINE SCH DROP: 5 SOLUTION OPHTHALMIC at 09:45

## 2023-02-13 RX ADMIN — CYCLOPENTOLATE HYDROCHLORIDE SCH DROP: 10 SOLUTION/ DROPS OPHTHALMIC at 09:50

## 2023-02-13 RX ADMIN — TROPICAMIDE SCH DROP: 10 SOLUTION/ DROPS OPHTHALMIC at 09:50

## 2023-02-13 RX ADMIN — KETOROLAC TROMETHAMINE SCH DROP: 5 SOLUTION OPHTHALMIC at 09:50

## 2023-02-13 RX ADMIN — OFLOXACIN SCH DROP: 3 SOLUTION/ DROPS OPHTHALMIC at 09:45

## 2023-02-13 RX ADMIN — PHENYLEPHRINE HYDROCHLORIDE SCH DROP: 0.25 SPRAY NASAL at 09:45

## 2023-02-13 RX ADMIN — OFLOXACIN SCH DROP: 3 SOLUTION/ DROPS OPHTHALMIC at 09:40

## 2023-02-13 RX ADMIN — PHENYLEPHRINE HYDROCHLORIDE SCH DROP: 0.25 SPRAY NASAL at 09:40

## 2023-02-13 RX ADMIN — PHENYLEPHRINE HYDROCHLORIDE SCH DROP: 0.25 SPRAY NASAL at 09:50

## 2023-02-13 RX ADMIN — TROPICAMIDE SCH DROP: 10 SOLUTION/ DROPS OPHTHALMIC at 09:40

## 2023-02-13 RX ADMIN — CYCLOPENTOLATE HYDROCHLORIDE SCH DROP: 10 SOLUTION/ DROPS OPHTHALMIC at 09:40

## 2023-02-13 RX ADMIN — TROPICAMIDE SCH DROP: 10 SOLUTION/ DROPS OPHTHALMIC at 09:45

## 2023-02-15 ENCOUNTER — APPOINTMENT (OUTPATIENT)
Dept: NEUROSURGERY | Facility: CLINIC | Age: 68
End: 2023-02-15
Payer: MEDICARE

## 2023-02-15 VITALS
HEIGHT: 63 IN | HEART RATE: 77 BPM | WEIGHT: 125 LBS | BODY MASS INDEX: 22.15 KG/M2 | SYSTOLIC BLOOD PRESSURE: 131 MMHG | DIASTOLIC BLOOD PRESSURE: 77 MMHG | OXYGEN SATURATION: 98 %

## 2023-02-15 PROCEDURE — 99205 OFFICE O/P NEW HI 60 MIN: CPT

## 2023-02-15 NOTE — HISTORY OF PRESENT ILLNESS
[de-identified] : Ms. Chi is being seen in neurosurgical consultation at the request of Dr. Scarlet Johnson.  She is a 67 year-old female with PMHx of DM2, HLD osteoporosis-no longer taking fosamax, GERD,  presenting with low back pain.  Has been present for many years.  Localized to the low back.  Exacerbated by movement.  Radiates to bilateral legs, specifically posteriorly and laterally.  Described as constant and aching.  Has caused her to present to ED several times due to pain.  Has taken motrin, oxycodone, and flexeril in past without significant relief of symptoms.  Has had epidural steroid injections in the past with some mild relief.  Unable to do PT because it worsens her pain. Presents today for further consultation regarding possible surgical interventions and imaging review (MRI lumbar spine, full details below).   Of note she no longer takes medications for her osteoporosis and has not had a recent dexa scan. \par Surg Hx: Bladder surgery, Csection, Cholecystectomy, polypectomy/colonoscopy\par Meds: ASA, Atorvastatin, Alendronate (no longer taking for years), metformin, gabapentin\par Allergies: NKDA, pepper\par Soc Hx: never smoker, no EtOH, \par

## 2023-02-15 NOTE — PHYSICAL EXAM
[General Appearance - Alert] : alert [General Appearance - In No Acute Distress] : in no acute distress [General Appearance - Well Nourished] : well nourished [General Appearance - Well Developed] : well developed [Oriented To Time, Place, And Person] : oriented to person, place, and time [Cranial Nerves Optic (II)] : visual acuity intact bilaterally,  pupils equal round and reactive to light [Cranial Nerves Oculomotor (III)] : extraocular motion intact [Cranial Nerves Trigeminal (V)] : facial sensation intact symmetrically [Cranial Nerves Facial (VII)] : face symmetrical [Cranial Nerves Vestibulocochlear (VIII)] : hearing was intact bilaterally [Cranial Nerves Glossopharyngeal (IX)] : tongue and palate midline [Cranial Nerves Accessory (XI - Cranial And Spinal)] : head turning and shoulder shrug symmetric [Cranial Nerves Hypoglossal (XII)] : there was no tongue deviation with protrusion [Motor Strength] : muscle strength was normal in all four extremities [Sensation Tactile Decrease] : light touch was intact [Balance] : balance was intact [2+] : Patella left 2+ [Antalgic] : antalgic

## 2023-02-15 NOTE — END OF VISIT
[FreeTextEntry3] : I have seen the patient and reviewed the case together with PA and I agree with the final recommendations and plan of care.\par \par Lee Pearson MD\par Neurosurgery\par \par  [Time Spent: ___ minutes] : I have spent [unfilled] minutes of time on the encounter. [>50% of the face to face encounter time was spent on counseling and/or coordination of care for ___] : Greater than 50% of the face to face encounter time was spent on counseling and/or coordination of care for [unfilled]

## 2023-02-15 NOTE — ASSESSMENT
[FreeTextEntry1] : I have discussed the natural history and treatment options for lumbar spondylosis with pars defect with the patient. I explained the indications for observation, conservative management, medical management, physical therapy, pain management approaches and surgery. I explained the different types and surgical approaches including anterior and posterior approaches as well as decompression only procedures and instrumented fusions. I discussed the risks, benefits, possible complications and expected outcome related to each treatment option. The risks of surgery were discussed in detail including but not limited to postoperative infection at the surgical site, hospital acquired pneumonia, hospital acquired urinary tract infection, postoperative meningitis, wound dehiscence, CSF leak, stroke (ischemic and hemorrhagic), postoperative seizures, worsening motor function due to spinal cord or nerve injury, postoperative visual deficit which could be permanent (blindness) when surgery is performed in a prone position, cardiovascular complications (MI, PE, DVT) and I also explained that some of these complications could lead to sepsis, coma or even death.\par \par In the end, I recommend additional imaging in the form of CT L spine to further evaluate the bone.  In addition, she will need new bone density test and Endocrinology evaluation for osteoporosis since she has been off her fosamax for some time. I have recommended a course of outpatient PT.   She should return to the office in 3 months time to discuss possible spinal fusion once her osteoporosis is optimized.   The patient understands the plan of care and is in agreement.  All questions answered to patient satisfaction.\par \par

## 2023-02-15 NOTE — DATA REVIEWED
[de-identified] : \par  MRI Report             Final\par \par No Documents Attached\par \par \par \par \par   [facility]\par                                          [address]\par                                    [place], [place]  [address]\par                                        Department of Radiology\par                                             [phone]\par \par \par Patient Name:      NERY WOMACK                Location:       [facility]\par Med Rec #:        [id number]                    Account #:      [id number]\par YOB: 1955                    Ordering:       Adrian Vidales DO\par Age: 67               Sex:    F                 Attending:      Adrian Vidales DO\par PCP:        Jose Enrique Frausto MD\par ______________________________________________________________________________________\par \par Exam Date:      11/19/22\par Exam:         MRI LUMBAR SPINE\par Order#:       [id number]\par \par \par \par Back pain.\par \par  Technique: MRI OF THE LUMBAR SPINE WITHOUT CONTRAST.\par  MRI of the lumbar spine was performed utilizing axial and sagittal T1 and T2-weighted images.\par Sagittal STIR images were also acquired.\par \par  Findings: Comparison is made to a prior MRI performed on 10/26/2015.\par \par  There is grade 2 anterolisthesis of L5 on S1 (approximately 8 to 9 mm), slightly increased when\par compared to the prior study. There are bilateral chronic pars defects, unchanged.\par \par  There is normal curvature to the lumbar lordosis. The vertebral bodies are normal height and\par configuration. There is mild loss of disc height and T2 signal at the L5/S1 disc space consistent\par with desiccation. The remaining intervertebral disc spaces are within normal limits. The conus\par terminates at the T12 level and demonstrates no evidence of abnormal signal changes.\par \par  Evaluation of the individual levels demonstrates at the L5/S1 level there is unroofing of the\par posterior aspect of the disc space due to the anterolisthesis. There is a superimposed disc bulge\par contacting the ventral thecal sac and contacting the bilateral L5 foraminal exiting nerve roots,\par unchanged. There is moderate to severe bilateral foraminal narrowing. There is moderate facet\par hypertrophy. There is no evidence of spinal canal stenosis.\par \par  At the L4/L5 level there is a minimal diffuse disc bulge contacting the ventral thecal sac. The\par bilateral neuroforamen demonstrate mild to moderate foraminal narrowing. There is mild facet\par hypertrophy. There is no evidence of spinal canal stenosis.\par \par  At the L3/L4 level there is a mild diffuse disc bulge with a superimposed mild left foraminal and\par far lateral shallow broad-based disc protrusion, new since the prior study. There is mild moderate\par bilateral foraminal narrowing. The bilateral L3 foraminal exiting nerve roots are within normal\par limits. There is mild to moderate facet and ligamentous hypertrophy. There is no evidence of spinal\par canal stenosis.\par \par  At the L2/L3 level there is a very minimal disc bulge contacting the ventral thecal sac. The\par bilateral neuroforamen are patent. There is no evidence of spinal canal stenosis.\par \par  At the L1/L2 level there is no evidence of a focal disc herniation. The bilateral neuroforamen are\par patent. There is no evidence of spinal canal stenosis.\par \par \par  IMPRESSION:\par \par  Grade 2 anterolisthesis of L5 on S1 with bilateral pars defects, slightly increased when compared\par to the prior study.\par \par  L5/S1 disc bulge contacting the bilateral L5 foraminal exiting nerve roots, unchanged. L5/S1 no\par evidence of spinal canal stenosis.\par \par  L4/L5 is a very minimal disc bulge contacting the ventral thecal sac. L4/L5 a no evidence of spinal\par canal stenosis.\par \par  L3/L4 mild diffuse disc bulge with a superimposed mild left foraminal and far lateral shallow\par broad-based disc protrusion, new since the prior study. L3/L4 no evidence of spinal canal stenosis.\par \par  L2/L3 very minimal disc bulge. L2/L3 no evidence of spinal canal stenosis.\par \par  --- End of Report ---\par \par ***Electronically Signed ***\par -----------------------------------------------\par Aminta Phillips MD              11/23/22 1821\par \par Dictated on 11/23/22\par \par \par Report cc:  Adrian Vidales DO;\par \par  \par \par  Ordered by: ADRIAN VIDALES       Collected/Examined: 19Nov2022 02:30PM       \par Verified by: ADRIAN VIDALES 29Nov2022 08:50AM       \par  Result Communication: No patient communication needed at this time;\par Stage: Final       \par  Performed at: [facility]       Resulted: 23Nov2022 06:21PM       Last Updated: 29Nov2022 08:50AM       Accession: [id number]

## 2023-03-12 NOTE — PHYSICAL EXAM
0 (no pain/absence of nonverbal indicators of pain) [Normal] : normal rate, regular rhythm, normal S1 and S2 and no murmur heard [No Edema] : there was no peripheral edema

## 2023-03-14 ENCOUNTER — RX RENEWAL (OUTPATIENT)
Age: 68
End: 2023-03-14

## 2023-03-17 ENCOUNTER — TRANSCRIPTION ENCOUNTER (OUTPATIENT)
Age: 68
End: 2023-03-17

## 2023-03-20 ENCOUNTER — NON-APPOINTMENT (OUTPATIENT)
Age: 68
End: 2023-03-20

## 2023-03-20 ENCOUNTER — TRANSCRIPTION ENCOUNTER (OUTPATIENT)
Age: 68
End: 2023-03-20

## 2023-03-20 RX ORDER — CEPHALEXIN 500 MG/1
500 CAPSULE ORAL 3 TIMES DAILY
Qty: 21 | Refills: 1 | Status: DISCONTINUED | COMMUNITY
Start: 2022-11-19 | End: 2023-03-20

## 2023-03-21 ENCOUNTER — APPOINTMENT (OUTPATIENT)
Dept: CARE COORDINATION | Facility: HOME HEALTH | Age: 68
End: 2023-03-21
Payer: MEDICARE

## 2023-03-21 VITALS
OXYGEN SATURATION: 98 % | DIASTOLIC BLOOD PRESSURE: 63 MMHG | HEART RATE: 73 BPM | RESPIRATION RATE: 18 BRPM | SYSTOLIC BLOOD PRESSURE: 125 MMHG

## 2023-03-21 DIAGNOSIS — Z01.818 ENCOUNTER FOR OTHER PREPROCEDURAL EXAMINATION: ICD-10-CM

## 2023-03-21 DIAGNOSIS — Z92.29 PERSONAL HISTORY OF OTHER DRUG THERAPY: ICD-10-CM

## 2023-03-21 DIAGNOSIS — Z87.2 PERSONAL HISTORY OF DISEASES OF THE SKIN AND SUBCUTANEOUS TISSUE: ICD-10-CM

## 2023-03-21 PROCEDURE — 99495 TRANSJ CARE MGMT MOD F2F 14D: CPT

## 2023-03-21 RX ORDER — COLD-HOT PACK
EACH MISCELLANEOUS DAILY
Qty: 30 | Refills: 0 | Status: ACTIVE | COMMUNITY
Start: 2023-03-21 | End: 1900-01-01

## 2023-03-21 NOTE — HISTORY OF PRESENT ILLNESS
[Family Member] : family member [FreeTextEntry1] : Follow-up for discharge from [facility] for a diagnosis of PNA. \par  [de-identified] : Patient is a 67 year old female enrolled in the [facility] program with a history of hyperlipidemia, diabetes mellitus but medically stable metformin and atorvastatin . Patient was admitted from 3/10/23- 3/17/23 to [facility] for a diagnosis of PNA. \par \[facility] chart reviewed and copied as per [facility] Discharge Summary:\par "67-year-old female patient with past medical history of hyperlipidemia, diabetes mellitus but medically stable metformin and atorvastatin home follows up with Dr. Rodriguez-presents to the ED of [facility] with left-sided below the breast pain.Around 1:30 PM according to the daughter Patient came to the ED for evaluation in the ED n patient had a CT of the chest symptoms suggestive of PE which is negative but does have findings of multiple rounded masses in the lung patient alos  to the left of the lung and small with stable pneumonia also in the left lower lobe which could be the cause of the pain". \par \par Patient observed via home visit and is alert and oriented x 3, in no acute distress. Patient observed sitting comfortably upright in bedroom chair during time of visit. Patient's daughter present and states patient is doing well but appears "tired and down". Daughter reports compliance with medications but states BS has been elevated in the 200's to 250's as result of the steroids. Patient has f/u appointment with pulmonologist on 4/623.  Patient states they are feeling well today. Patient states they are eating and drinking well. Denies any cough, fever, chills, abdominal pain, palpitations, nausea, vomiting, diarrhea, lightheadedness, dizziness, shortness of breath, or chest pain.\par \par Patient contacted by TCM RN within 48 hours of discharge and d/c instructions reviewed.  Discharge medications were reviewed and reconciled with the current medication list and medications in home. Patient had 48 hour follow up call done by ABRAHAM Venegas on 3/18/23.\par

## 2023-03-21 NOTE — ASSESSMENT
[FreeTextEntry1] : Patient is a 67 year old female enrolled in the [facility] with a history of hyperlipidemia, diabetes mellitus but medically stable metformin and atorvastatin . Patient was admitted from 3/10/23- 3/17/23 to [facility] for a diagnosis of PNA. \[facility] \[facility] chart reviewed and copied as per [facility] Discharge Summary:\par "67-year-old female patient with past medical history of hyperlipidemia, diabetes mellitus but medically stable metformin and atorvastatin home follows up with Dr. Rodriguez-presents to the ED of [facility] with left-sided below the breast pain.Around 1:30 PM according to the daughter Patient came to the ED for evaluation in the ED n patient had a CT of the chest symptoms suggestive of PE which is negative but does have findings of multiple rounded masses in the lung patient alos  to the left of the lung and small with stable pneumonia also in the left lower lobe which could be the cause of the pain". \par \par Patient observed via home visit and is alert and oriented x 3, in no acute distress. Patient observed sitting comfortably upright in bedroom chair during time of visit. Patient's daughter present and states patient is doing well but appears "tired and down". Daughter reports compliance with medications but states BS has been elevated in the 200's to 250's as result of the steroids. Patient has f/u appointment with pulmonologist on 4/623.  Patient states they are feeling well today. Patient states they are eating and drinking well. Denies any cough, fever, chills, abdominal pain, palpitations, nausea, vomiting, diarrhea, lightheadedness, dizziness, shortness of breath, or chest pain.\par \par Patient contacted by TCM RN within 48 hours of discharge and d/c instructions reviewed.  Discharge medications were reviewed and reconciled with the current medication list and medications in home. Patient had 48 hour follow up call done by ABRAHAM Venegas on 3/18/23.

## 2023-03-21 NOTE — REVIEW OF SYSTEMS
[Fever] : no fever [Chills] : no chills [Fatigue] : fatigue [Hot Flashes] : no hot flashes [Night Sweats] : no night sweats [Chest Pain] : no chest pain [Palpitations] : no palpitations [Leg Claudication] : no leg claudication [Lower Ext Edema] : no lower extremity edema [Orthopnea] : no orthopnea [Shortness Of Breath] : no shortness of breath [Wheezing] : no wheezing [Cough] : no cough [Dyspnea on Exertion] : no dyspnea on exertion [Abdominal Pain] : no abdominal pain [Nausea] : no nausea [Constipation] : no constipation [Vomiting] : no vomiting [Dysuria] : no dysuria [Incontinence] : no incontinence [Hematuria] : no hematuria [Joint Pain] : no joint pain [Joint Swelling] : no joint swelling [Muscle Pain] : no muscle pain [Itching] : no itching [Skin Rash] : no skin rash [Headache] : no headache [Dizziness] : no dizziness [Memory Loss] : no memory loss [Suicidal] : not suicidal [Insomnia] : no insomnia [Anxiety] : no anxiety [Easy Bleeding] : no easy bleeding [Easy Bruising] : no easy bruising [Negative] : ENT

## 2023-03-21 NOTE — PHYSICAL EXAM
[No Acute Distress] : no acute distress [Well Nourished] : well nourished [Well Developed] : well developed [Well-Appearing] : well-appearing [Normal Sclera/Conjunctiva] : normal sclera/conjunctiva [PERRL] : pupils equal round and reactive to light [Normal Outer Ear/Nose] : the outer ears and nose were normal in appearance [Normal Oropharynx] : the oropharynx was normal [No JVD] : no jugular venous distention [No Lymphadenopathy] : no lymphadenopathy [No Respiratory Distress] : no respiratory distress  [No Accessory Muscle Use] : no accessory muscle use [Clear to Auscultation] : lungs were clear to auscultation bilaterally [Normal Rate] : normal rate  [Regular Rhythm] : with a regular rhythm [Normal S1, S2] : normal S1 and S2 [No Varicosities] : no varicosities [No Edema] : there was no peripheral edema [No Extremity Clubbing/Cyanosis] : no extremity clubbing/cyanosis [Soft] : abdomen soft [Non Tender] : non-tender [Non-distended] : non-distended [Normal Bowel Sounds] : normal bowel sounds [No Joint Swelling] : no joint swelling [No Rash] : no rash [Normal Gait] : normal gait [Normal Affect] : the affect was normal [Normal Insight/Judgement] : insight and judgment were intact

## 2023-03-24 ENCOUNTER — TRANSCRIPTION ENCOUNTER (OUTPATIENT)
Age: 68
End: 2023-03-24

## 2023-03-30 ENCOUNTER — APPOINTMENT (OUTPATIENT)
Dept: FAMILY MEDICINE | Facility: CLINIC | Age: 68
End: 2023-03-30
Payer: MEDICARE

## 2023-03-30 ENCOUNTER — TRANSCRIPTION ENCOUNTER (OUTPATIENT)
Age: 68
End: 2023-03-30

## 2023-03-30 VITALS
HEIGHT: 63 IN | SYSTOLIC BLOOD PRESSURE: 110 MMHG | HEART RATE: 75 BPM | BODY MASS INDEX: 23.74 KG/M2 | DIASTOLIC BLOOD PRESSURE: 70 MMHG | TEMPERATURE: 98.8 F | OXYGEN SATURATION: 98 % | WEIGHT: 134 LBS

## 2023-03-30 DIAGNOSIS — D64.9 ANEMIA, UNSPECIFIED: ICD-10-CM

## 2023-03-30 PROCEDURE — 99495 TRANSJ CARE MGMT MOD F2F 14D: CPT | Mod: 25

## 2023-03-30 PROCEDURE — 36415 COLL VENOUS BLD VENIPUNCTURE: CPT

## 2023-03-30 RX ORDER — CYCLOBENZAPRINE HYDROCHLORIDE 5 MG/1
5 TABLET, FILM COATED ORAL
Qty: 15 | Refills: 0 | Status: DISCONTINUED | COMMUNITY
Start: 2022-11-01 | End: 2023-03-30

## 2023-03-31 LAB
ALBUMIN SERPL ELPH-MCNC: 3.8 G/DL
ALP BLD-CCNC: 111 U/L
ALT SERPL-CCNC: 18 U/L
ANION GAP SERPL CALC-SCNC: 11 MMOL/L
AST SERPL-CCNC: 13 U/L
BASOPHILS # BLD AUTO: 0.01 K/UL
BASOPHILS NFR BLD AUTO: 0.1 %
BILIRUB SERPL-MCNC: 0.2 MG/DL
BUN SERPL-MCNC: 20 MG/DL
CALCIUM SERPL-MCNC: 9.9 MG/DL
CHLORIDE SERPL-SCNC: 100 MMOL/L
CO2 SERPL-SCNC: 28 MMOL/L
CREAT SERPL-MCNC: 0.58 MG/DL
EGFR: 98 ML/MIN/1.73M2
EOSINOPHIL # BLD AUTO: 0.04 K/UL
EOSINOPHIL NFR BLD AUTO: 0.3 %
ESTIMATED AVERAGE GLUCOSE: 180 MG/DL
FERRITIN SERPL-MCNC: 89 NG/ML
FOLATE SERPL-MCNC: >20 NG/ML
GLUCOSE SERPL-MCNC: 147 MG/DL
HBA1C MFR BLD HPLC: 7.9 %
HCT VFR BLD CALC: 38.9 %
HGB BLD-MCNC: 12.4 G/DL
IMM GRANULOCYTES NFR BLD AUTO: 0.7 %
IRON SATN MFR SERPL: 21 %
IRON SERPL-MCNC: 58 UG/DL
LYMPHOCYTES # BLD AUTO: 3.69 K/UL
LYMPHOCYTES NFR BLD AUTO: 27.8 %
MAN DIFF?: NORMAL
MCHC RBC-ENTMCNC: 28.9 PG
MCHC RBC-ENTMCNC: 31.9 GM/DL
MCV RBC AUTO: 90.7 FL
MONOCYTES # BLD AUTO: 0.98 K/UL
MONOCYTES NFR BLD AUTO: 7.4 %
NEUTROPHILS # BLD AUTO: 8.45 K/UL
NEUTROPHILS NFR BLD AUTO: 63.7 %
PLATELET # BLD AUTO: 355 K/UL
POTASSIUM SERPL-SCNC: 4.5 MMOL/L
PROT SERPL-MCNC: 6.4 G/DL
RBC # BLD: 4.29 M/UL
RBC # FLD: 14.1 %
SODIUM SERPL-SCNC: 139 MMOL/L
TIBC SERPL-MCNC: 274 UG/DL
UIBC SERPL-MCNC: 216 UG/DL
VIT B12 SERPL-MCNC: 1421 PG/ML
WBC # FLD AUTO: 13.26 K/UL

## 2023-04-05 NOTE — PHYSICAL EXAM
[No Acute Distress] : no acute distress [Well Nourished] : well nourished [Well Developed] : well developed [Well-Appearing] : well-appearing [Normal Sclera/Conjunctiva] : normal sclera/conjunctiva [PERRL] : pupils equal round and reactive to light [EOMI] : extraocular movements intact [Normal Outer Ear/Nose] : the outer ears and nose were normal in appearance [Normal Oropharynx] : the oropharynx was normal [No JVD] : no jugular venous distention [No Lymphadenopathy] : no lymphadenopathy [Supple] : supple [Thyroid Normal, No Nodules] : the thyroid was normal and there were no nodules present [No Respiratory Distress] : no respiratory distress  [No Accessory Muscle Use] : no accessory muscle use [Clear to Auscultation] : lungs were clear to auscultation bilaterally [Normal Rate] : normal rate  [Regular Rhythm] : with a regular rhythm [Normal S1, S2] : normal S1 and S2 [No Murmur] : no murmur heard [No Carotid Bruits] : no carotid bruits [No Abdominal Bruit] : a ~M bruit was not heard ~T in the abdomen [No Varicosities] : no varicosities [Pedal Pulses Present] : the pedal pulses are present [No Edema] : there was no peripheral edema [No Palpable Aorta] : no palpable aorta [No Extremity Clubbing/Cyanosis] : no extremity clubbing/cyanosis [Soft] : abdomen soft [Non Tender] : non-tender [Non-distended] : non-distended [No Masses] : no abdominal mass palpated [No HSM] : no HSM [Normal Bowel Sounds] : normal bowel sounds [Normal Posterior Cervical Nodes] : no posterior cervical lymphadenopathy [Normal Anterior Cervical Nodes] : no anterior cervical lymphadenopathy [No CVA Tenderness] : no CVA  tenderness [No Spinal Tenderness] : no spinal tenderness [No Joint Swelling] : no joint swelling [Grossly Normal Strength/Tone] : grossly normal strength/tone [No Rash] : no rash [Coordination Grossly Intact] : coordination grossly intact [No Focal Deficits] : no focal deficits [Normal Gait] : normal gait [Deep Tendon Reflexes (DTR)] : deep tendon reflexes were 2+ and symmetric [Normal Affect] : the affect was normal [Normal Insight/Judgement] : insight and judgment were intact [09122 - Moderate Complexity requires multiple possible diagnoses and/or the management options, moderate complexity of the medical data (tests, etc.) to be reviewed, and moderate risk of significant complications, morbidity, and/or mortality as well as co] : Moderate Complexity

## 2023-04-05 NOTE — HISTORY OF PRESENT ILLNESS
[Post-hospitalization from ___ Hospital] : Post-hospitalization from [unfilled] Hospital [Admitted on: ___] : The patient was admitted on [unfilled] [Discharged on ___] : discharged on [unfilled] [FreeTextEntry2] : f/u DM2 and crytopgenic organizing pneumonia

## 2023-04-06 ENCOUNTER — APPOINTMENT (OUTPATIENT)
Dept: PULMONOLOGY | Facility: CLINIC | Age: 68
End: 2023-04-06
Payer: MEDICARE

## 2023-04-06 VITALS
HEIGHT: 63 IN | HEART RATE: 84 BPM | SYSTOLIC BLOOD PRESSURE: 100 MMHG | WEIGHT: 135 LBS | DIASTOLIC BLOOD PRESSURE: 60 MMHG | BODY MASS INDEX: 23.92 KG/M2 | OXYGEN SATURATION: 98 %

## 2023-04-06 PROCEDURE — 99214 OFFICE O/P EST MOD 30 MIN: CPT

## 2023-04-06 RX ORDER — PREDNISONE 20 MG/1
20 TABLET ORAL DAILY
Qty: 30 | Refills: 2 | Status: ACTIVE | COMMUNITY
Start: 2023-04-06 | End: 1900-01-01

## 2023-04-06 NOTE — ASSESSMENT
[FreeTextEntry1] :   Patient with a  presumptive diagnosis of cryptogenic organizing pneumonia.  Patient will be placed on 20 mg of prednisone and return to see me in 1 month.  A chest x-ray will be done at that time.  She is instructed that if she develops any recurrent symptoms of cough fever shortness of breath she must call me immediately.

## 2023-04-06 NOTE — HISTORY OF PRESENT ILLNESS
[FreeTextEntry1] : Follow-up recent hospitalization. [de-identified] :   Patient discharged from the hospital on March 17.  She was discharged with a presumptive diagnosis of cryptogenic organizing pneumonia when she presented with low-grade fever cough and bilateral groundglass infiltrates.  She did not respond to oral antibiotics initially and did not respond to IV antibiotics.  She had an elevated sed rate.  She was placed on prednisone 40 mg daily and felt much better very quickly.  She was discharged on 40 mg of prednisone which she stopped last Saturday on 4–1.  She continues to feel fine with no cough, wheezing or shortness of breath.  Her blood sugars range 2-300 while on prednisone.  Current O2 sat 99 on room air.  There is no history of underlying lung disease.

## 2023-04-17 ENCOUNTER — TRANSCRIPTION ENCOUNTER (OUTPATIENT)
Age: 68
End: 2023-04-17

## 2023-05-10 ENCOUNTER — RESULT REVIEW (OUTPATIENT)
Age: 68
End: 2023-05-10

## 2023-05-10 ENCOUNTER — APPOINTMENT (OUTPATIENT)
Dept: PULMONOLOGY | Facility: CLINIC | Age: 68
End: 2023-05-10
Payer: MEDICARE

## 2023-05-10 VITALS
OXYGEN SATURATION: 97 % | HEIGHT: 63 IN | SYSTOLIC BLOOD PRESSURE: 120 MMHG | WEIGHT: 135 LBS | HEART RATE: 94 BPM | BODY MASS INDEX: 23.92 KG/M2 | TEMPERATURE: 97.1 F | DIASTOLIC BLOOD PRESSURE: 60 MMHG

## 2023-05-10 PROCEDURE — 99214 OFFICE O/P EST MOD 30 MIN: CPT

## 2023-05-10 NOTE — ASSESSMENT
[FreeTextEntry1] :   Patient with likely flareup of cryptogenic organizing pneumonia due to stopping prednisone.  Patient is to renew her 20 mg of prednisone and get a chest x-ray.  I will speak to patient's daughter next week.

## 2023-05-10 NOTE — HISTORY OF PRESENT ILLNESS
[FreeTextEntry1] : Follow-up cryptogenic organizing pneumonia. [de-identified] :   Patient was doing well on prednisone 20 mg daily for the past 1 month.  However she ran out of it 5 days ago.  2 days ago she started coughing again she also had severe left shoulder pain and had difficulty raising the left arm.  She renewed the prednisone and took it last night.  She still has an occasional dry cough and difficulty raising the left shoulder.  No fever or chills.  No weight loss.  No shortness of breath.

## 2023-05-26 ENCOUNTER — APPOINTMENT (OUTPATIENT)
Dept: ORTHOPEDIC SURGERY | Facility: CLINIC | Age: 68
End: 2023-05-26

## 2023-05-30 ENCOUNTER — APPOINTMENT (OUTPATIENT)
Dept: FAMILY MEDICINE | Facility: CLINIC | Age: 68
End: 2023-05-30
Payer: MEDICARE

## 2023-05-30 VITALS
SYSTOLIC BLOOD PRESSURE: 116 MMHG | WEIGHT: 139 LBS | BODY MASS INDEX: 25.91 KG/M2 | DIASTOLIC BLOOD PRESSURE: 60 MMHG | HEIGHT: 61.5 IN

## 2023-05-30 DIAGNOSIS — R21 RASH AND OTHER NONSPECIFIC SKIN ERUPTION: ICD-10-CM

## 2023-05-30 DIAGNOSIS — E55.9 VITAMIN D DEFICIENCY, UNSPECIFIED: ICD-10-CM

## 2023-05-30 DIAGNOSIS — M54.16 RADICULOPATHY, LUMBAR REGION: ICD-10-CM

## 2023-05-30 DIAGNOSIS — M48.061 SPINAL STENOSIS, LUMBAR REGION WITHOUT NEUROGENIC CLAUDICATION: ICD-10-CM

## 2023-05-30 DIAGNOSIS — Z23 ENCOUNTER FOR IMMUNIZATION: ICD-10-CM

## 2023-05-30 DIAGNOSIS — M81.0 AGE-RELATED OSTEOPOROSIS W/OUT CURRENT PATHOLOGICAL FRACTURE: ICD-10-CM

## 2023-05-30 DIAGNOSIS — M79.2 NEURALGIA AND NEURITIS, UNSPECIFIED: ICD-10-CM

## 2023-05-30 DIAGNOSIS — K21.9 GASTRO-ESOPHAGEAL REFLUX DISEASE W/OUT ESOPHAGITIS: ICD-10-CM

## 2023-05-30 DIAGNOSIS — M17.10 UNILATERAL PRIMARY OSTEOARTHRITIS, UNSPECIFIED KNEE: ICD-10-CM

## 2023-05-30 DIAGNOSIS — Z00.00 ENCOUNTER FOR GENERAL ADULT MEDICAL EXAMINATION W/OUT ABNORMAL FINDINGS: ICD-10-CM

## 2023-05-30 DIAGNOSIS — M43.17 SPONDYLOLISTHESIS, LUMBOSACRAL REGION: ICD-10-CM

## 2023-05-30 PROCEDURE — G0444 DEPRESSION SCREEN ANNUAL: CPT | Mod: 59

## 2023-05-30 PROCEDURE — 99397 PER PM REEVAL EST PAT 65+ YR: CPT | Mod: 25,GY

## 2023-05-30 PROCEDURE — 36415 COLL VENOUS BLD VENIPUNCTURE: CPT

## 2023-05-30 RX ORDER — SILVER SULFADIAZINE 10 MG/G
1 CREAM TOPICAL TWICE DAILY
Qty: 1 | Refills: 1 | Status: DISCONTINUED | COMMUNITY
Start: 2022-11-19 | End: 2023-05-30

## 2023-05-30 RX ORDER — ZOSTER VACCINE RECOMBINANT, ADJUVANTED 50 MCG/0.5
50 KIT INTRAMUSCULAR
Qty: 1 | Refills: 1 | Status: ACTIVE | COMMUNITY
Start: 2023-05-30 | End: 1900-01-01

## 2023-05-30 RX ORDER — GABAPENTIN 100 MG/1
100 CAPSULE ORAL
Qty: 90 | Refills: 0 | Status: DISCONTINUED | COMMUNITY
Start: 2022-11-04 | End: 2023-05-30

## 2023-05-30 RX ORDER — SILVER SULFADIAZINE 10 MG/G
1 CREAM TOPICAL DAILY
Qty: 1 | Refills: 1 | Status: DISCONTINUED | COMMUNITY
Start: 2022-12-17 | End: 2023-05-30

## 2023-05-30 RX ORDER — GLIPIZIDE 5 MG/1
5 TABLET ORAL DAILY
Qty: 14 | Refills: 0 | Status: DISCONTINUED | COMMUNITY
Start: 2023-03-21 | End: 2023-05-30

## 2023-06-01 RX ORDER — MUPIROCIN 20 MG/G
2 OINTMENT TOPICAL TWICE DAILY
Qty: 1 | Refills: 1 | Status: ACTIVE | COMMUNITY
Start: 2023-05-30 | End: 1900-01-01

## 2023-06-06 LAB
25(OH)D3 SERPL-MCNC: 44.8 NG/ML
ALBUMIN SERPL ELPH-MCNC: 4.2 G/DL
ALP BLD-CCNC: 119 U/L
ALT SERPL-CCNC: 22 U/L
ANION GAP SERPL CALC-SCNC: 17 MMOL/L
APPEARANCE: CLEAR
AST SERPL-CCNC: 22 U/L
BACTERIA UR CULT: NORMAL
BACTERIA: NEGATIVE /HPF
BILIRUB SERPL-MCNC: 0.6 MG/DL
BILIRUBIN URINE: NEGATIVE
BLOOD URINE: NEGATIVE
BUN SERPL-MCNC: 20 MG/DL
CALCIUM SERPL-MCNC: 9.5 MG/DL
CAST: 0 /LPF
CHLORIDE SERPL-SCNC: 99 MMOL/L
CHOLEST SERPL-MCNC: 206 MG/DL
CO2 SERPL-SCNC: 21 MMOL/L
COLOR: YELLOW
CREAT SERPL-MCNC: 0.61 MG/DL
EGFR: 97 ML/MIN/1.73M2
EPITHELIAL CELLS: 0 /HPF
ESTIMATED AVERAGE GLUCOSE: 240 MG/DL
FERRITIN SERPL-MCNC: 76 NG/ML
FOLATE SERPL-MCNC: 17.3 NG/ML
GLUCOSE QUALITATIVE U: >=1000 MG/DL
GLUCOSE SERPL-MCNC: 386 MG/DL
HBA1C MFR BLD HPLC: 10 %
HDLC SERPL-MCNC: 106 MG/DL
IRON SATN MFR SERPL: 29 %
IRON SERPL-MCNC: 92 UG/DL
KETONES URINE: NEGATIVE MG/DL
LDLC SERPL CALC-MCNC: 72 MG/DL
LEUKOCYTE ESTERASE URINE: NEGATIVE
MICROSCOPIC-UA: NORMAL
NITRITE URINE: NEGATIVE
NONHDLC SERPL-MCNC: 100 MG/DL
PH URINE: 5.5
POTASSIUM SERPL-SCNC: 4.7 MMOL/L
PROT SERPL-MCNC: 6.6 G/DL
PROTEIN URINE: NEGATIVE MG/DL
RED BLOOD CELLS URINE: 1 /HPF
SODIUM SERPL-SCNC: 136 MMOL/L
SPECIFIC GRAVITY URINE: 1.03
T4 FREE SERPL-MCNC: 1.5 NG/DL
TIBC SERPL-MCNC: 312 UG/DL
TRIGL SERPL-MCNC: 138 MG/DL
TSH SERPL-ACNC: 0.32 UIU/ML
UIBC SERPL-MCNC: 220 UG/DL
UROBILINOGEN URINE: 0.2 MG/DL
VIT B12 SERPL-MCNC: 919 PG/ML
WHITE BLOOD CELLS URINE: 0 /HPF

## 2023-06-09 ENCOUNTER — APPOINTMENT (OUTPATIENT)
Dept: OBGYN | Facility: CLINIC | Age: 68
End: 2023-06-09
Payer: MEDICARE

## 2023-06-09 ENCOUNTER — NON-APPOINTMENT (OUTPATIENT)
Age: 68
End: 2023-06-09

## 2023-06-09 VITALS
WEIGHT: 138 LBS | BODY MASS INDEX: 25.72 KG/M2 | DIASTOLIC BLOOD PRESSURE: 80 MMHG | HEIGHT: 61.5 IN | SYSTOLIC BLOOD PRESSURE: 120 MMHG

## 2023-06-09 DIAGNOSIS — Z01.419 ENCOUNTER FOR GYNECOLOGICAL EXAMINATION (GENERAL) (ROUTINE) W/OUT ABNORMAL FINDINGS: ICD-10-CM

## 2023-06-09 PROCEDURE — G0101: CPT

## 2023-06-09 NOTE — PHYSICAL EXAM
[Chaperone Declined] : Patient declined chaperone [Appropriately responsive] : appropriately responsive [Alert] : alert [No Acute Distress] : no acute distress [No Lymphadenopathy] : no lymphadenopathy [Soft] : soft [Non-tender] : non-tender [Non-distended] : non-distended [No HSM] : No HSM [No Lesions] : no lesions [No Mass] : no mass [Oriented x3] : oriented x3 [Examination Of The Breasts] : a normal appearance [No Masses] : no breast masses were palpable [Vulvar Atrophy] : vulvar atrophy [Labia Majora] : normal [Labia Minora] : normal [Atrophy] : atrophy [Cervical Stenosis] : cervical stenosis [Normal] : normal [Uterine Adnexae] : normal

## 2023-06-09 NOTE — HISTORY OF PRESENT ILLNESS
[FreeTextEntry1] : 67yo here for new gyn/ here with her daughter who has been our prenatal patient in the past.\par Pt is  without any bleeding.\par States she [place] vaginal [place] after having children and intercourse was painful after that.  She is not sexually active now.  Denies itching, bleeding, pain or other gyn issue.\par \par She lives with her daughter.  She is .  Follows with primary care.

## 2023-06-12 ENCOUNTER — APPOINTMENT (OUTPATIENT)
Dept: PULMONOLOGY | Facility: CLINIC | Age: 68
End: 2023-06-12
Payer: MEDICARE

## 2023-06-12 VITALS
BODY MASS INDEX: 26.06 KG/M2 | WEIGHT: 138 LBS | HEART RATE: 101 BPM | SYSTOLIC BLOOD PRESSURE: 130 MMHG | HEIGHT: 61 IN | OXYGEN SATURATION: 96 % | DIASTOLIC BLOOD PRESSURE: 70 MMHG

## 2023-06-12 PROBLEM — Z00.00 ENCOUNTER FOR PREVENTIVE HEALTH EXAMINATION: Status: ACTIVE | Noted: 2019-01-21

## 2023-06-12 PROBLEM — M79.2 NEUROPATHIC PAIN: Status: ACTIVE | Noted: 2022-11-04

## 2023-06-12 PROBLEM — M81.0 AGE RELATED OSTEOPOROSIS: Status: ACTIVE | Noted: 2019-02-27

## 2023-06-12 PROBLEM — M54.16 LUMBAR RADICULOPATHY, CHRONIC: Status: ACTIVE | Noted: 2022-11-04

## 2023-06-12 PROBLEM — E55.9 VITAMIN D DEFICIENCY: Status: ACTIVE | Noted: 2022-02-01

## 2023-06-12 PROBLEM — K21.9 GERD (GASTROESOPHAGEAL REFLUX DISEASE): Status: ACTIVE | Noted: 2019-03-09

## 2023-06-12 PROBLEM — M48.061 LUMBAR SPINAL STENOSIS: Status: ACTIVE | Noted: 2022-11-04

## 2023-06-12 PROBLEM — M17.10 PRIMARY OSTEOARTHRITIS OF KNEE, UNSPECIFIED LATERALITY: Status: ACTIVE | Noted: 2019-01-22

## 2023-06-12 PROBLEM — M43.17 SPONDYLOLISTHESIS AT L5-S1 LEVEL: Status: ACTIVE | Noted: 2023-02-15

## 2023-06-12 PROCEDURE — 99214 OFFICE O/P EST MOD 30 MIN: CPT

## 2023-06-12 RX ORDER — PREDNISONE 5 MG/1
5 TABLET ORAL
Qty: 90 | Refills: 2 | Status: ACTIVE | COMMUNITY
Start: 2023-06-12 | End: 1900-01-01

## 2023-06-12 NOTE — HISTORY OF PRESENT ILLNESS
[FreeTextEntry1] : Follow-up cryptogenic organizing pneumonia. [de-identified] :   Patient on 20 mg prednisone.  She feels well without chest pain, shortness of breath or wheezing.  There is no cough.  However her blood sugars are as high as the 300s.  She requires a CAT scan of the chest which is a 3-month follow-up.

## 2023-06-12 NOTE — ASSESSMENT
[FreeTextEntry1] :   Patient doing well on prednisone 20 mg daily.  However her blood sugars are quite high.  We will repeat a CAT scan of the chest and reduce prednisone to 15 mg daily.  Patient is to return in 1 month.

## 2023-06-12 NOTE — HEALTH RISK ASSESSMENT
[No] : No [No falls in past year] : Patient reported no falls in the past year [0] : 2) Feeling down, depressed, or hopeless: Not at all (0) [Patient reported mammogram was normal] : Patient reported mammogram was normal [Patient declined PAP Smear] : Patient declined PAP Smear [Patient reported bone density results were abnormal] : Patient reported bone density results were abnormal [Patient reported colonoscopy was normal] : Patient reported colonoscopy was normal [Fully functional (bathing, dressing, toileting, transferring, walking, feeding)] : Fully functional (bathing, dressing, toileting, transferring, walking, feeding) [Fully functional (using the telephone, shopping, preparing meals, housekeeping, doing laundry, using] : Fully functional and needs no help or supervision to perform IADLs (using the telephone, shopping, preparing meals, housekeeping, doing laundry, using transportation, managing medications and managing finances) [Never] : Never [PHQ-2 Negative - No further assessment needed] : PHQ-2 Negative - No further assessment needed [[id number]] : 0 [MammogramDate] : 01/21 [BoneDensityDate] : 12/20 [MammogramComments] : ordered today [BoneDensityComments] : ordered today [ColonoscopyDate] : 09/17

## 2023-06-12 NOTE — PHYSICAL EXAM
[No Acute Distress] : no acute distress [Well Nourished] : well nourished [Well Developed] : well developed [Well-Appearing] : well-appearing [Normal Sclera/Conjunctiva] : normal sclera/conjunctiva [PERRL] : pupils equal round and reactive to light [EOMI] : extraocular movements intact [Normal Outer Ear/Nose] : the outer ears and nose were normal in appearance [Normal Oropharynx] : the oropharynx was normal [No JVD] : no jugular venous distention [No Lymphadenopathy] : no lymphadenopathy [Thyroid Normal, No Nodules] : the thyroid was normal and there were no nodules present [Supple] : supple [No Respiratory Distress] : no respiratory distress  [No Accessory Muscle Use] : no accessory muscle use [Clear to Auscultation] : lungs were clear to auscultation bilaterally [Normal Rate] : normal rate  [Regular Rhythm] : with a regular rhythm [Normal S1, S2] : normal S1 and S2 [No Murmur] : no murmur heard [No Carotid Bruits] : no carotid bruits [No Abdominal Bruit] : a ~M bruit was not heard ~T in the abdomen [No Varicosities] : no varicosities [Pedal Pulses Present] : the pedal pulses are present [No Edema] : there was no peripheral edema [No Palpable Aorta] : no palpable aorta [No Extremity Clubbing/Cyanosis] : no extremity clubbing/cyanosis [Soft] : abdomen soft [Non Tender] : non-tender [Non-distended] : non-distended [No Masses] : no abdominal mass palpated [No HSM] : no HSM [Normal Bowel Sounds] : normal bowel sounds [Normal Posterior Cervical Nodes] : no posterior cervical lymphadenopathy [Normal Anterior Cervical Nodes] : no anterior cervical lymphadenopathy [No CVA Tenderness] : no CVA  tenderness [No Spinal Tenderness] : no spinal tenderness [No Focal Deficits] : no focal deficits [Normal Gait] : normal gait [Normal Affect] : the affect was normal [Normal Insight/Judgement] : insight and judgment were intact

## 2023-06-25 LAB
CYTOLOGY CVX/VAG DOC THIN PREP: ABNORMAL
HPV HIGH+LOW RISK DNA PNL CVX: NOT DETECTED

## 2023-07-19 ENCOUNTER — APPOINTMENT (OUTPATIENT)
Dept: PULMONOLOGY | Facility: CLINIC | Age: 68
End: 2023-07-19

## 2023-07-29 ENCOUNTER — RESULT REVIEW (OUTPATIENT)
Age: 68
End: 2023-07-29

## 2023-08-03 ENCOUNTER — RESULT REVIEW (OUTPATIENT)
Age: 68
End: 2023-08-03

## 2023-08-21 ENCOUNTER — APPOINTMENT (OUTPATIENT)
Dept: PULMONOLOGY | Facility: CLINIC | Age: 68
End: 2023-08-21
Payer: MEDICARE

## 2023-08-21 VITALS
HEIGHT: 61 IN | OXYGEN SATURATION: 98 % | SYSTOLIC BLOOD PRESSURE: 110 MMHG | BODY MASS INDEX: 26.06 KG/M2 | DIASTOLIC BLOOD PRESSURE: 60 MMHG | HEART RATE: 100 BPM | WEIGHT: 138 LBS

## 2023-08-21 PROCEDURE — 99213 OFFICE O/P EST LOW 20 MIN: CPT

## 2023-08-21 RX ORDER — PREDNISONE 10 MG/1
10 TABLET ORAL
Qty: 30 | Refills: 2 | Status: ACTIVE | COMMUNITY
Start: 2023-08-21 | End: 1900-01-01

## 2023-08-21 NOTE — HISTORY OF PRESENT ILLNESS
[TextBox_4] : Patient on 15 mg of prednisone.  She feels well with no shortness of breath cough or wheezing.  There have been no side effects with the prednisone.  Her last CAT scan of the chest was reviewed by me personally on 720 9/2/2023 reveals resolution of pulmonary infiltrates since 3–14.  There are minimal faint groundglass density in the lower lobes left greater than right.

## 2023-08-21 NOTE — ASSESSMENT
[FreeTextEntry1] : Patient with a clinical diagnosis of cryptogenic organizing pneumonia currently on prednisone 15 mg daily.  She has responded well to prednisone and is asymptomatic.  We will reduce prednisone to 10 mg.  Return to see me in 2 months.

## 2023-10-09 ENCOUNTER — APPOINTMENT (OUTPATIENT)
Dept: PULMONOLOGY | Facility: CLINIC | Age: 68
End: 2023-10-09
Payer: MEDICARE

## 2023-10-09 ENCOUNTER — RESULT REVIEW (OUTPATIENT)
Age: 68
End: 2023-10-09

## 2023-10-09 VITALS
SYSTOLIC BLOOD PRESSURE: 110 MMHG | BODY MASS INDEX: 26.43 KG/M2 | WEIGHT: 140 LBS | DIASTOLIC BLOOD PRESSURE: 70 MMHG | OXYGEN SATURATION: 98 % | HEART RATE: 89 BPM | HEIGHT: 61 IN

## 2023-10-09 PROCEDURE — 99214 OFFICE O/P EST MOD 30 MIN: CPT

## 2023-10-09 RX ORDER — PREDNISONE 10 MG/1
10 TABLET ORAL
Qty: 90 | Refills: 3 | Status: ACTIVE | COMMUNITY
Start: 2023-10-09 | End: 1900-01-01

## 2023-10-22 RX ORDER — BLOOD-GLUCOSE METER
KIT MISCELLANEOUS
Qty: 1 | Refills: 0 | Status: ACTIVE | COMMUNITY
Start: 2021-12-02 | End: 1900-01-01

## 2023-10-24 RX ORDER — FAMOTIDINE 20 MG/1
20 TABLET, FILM COATED ORAL
Qty: 90 | Refills: 3 | Status: ACTIVE | COMMUNITY
Start: 2021-06-12 | End: 1900-01-01

## 2023-12-11 ENCOUNTER — APPOINTMENT (OUTPATIENT)
Dept: PULMONOLOGY | Facility: CLINIC | Age: 68
End: 2023-12-11
Payer: MEDICARE

## 2023-12-11 VITALS
DIASTOLIC BLOOD PRESSURE: 60 MMHG | OXYGEN SATURATION: 99 % | SYSTOLIC BLOOD PRESSURE: 110 MMHG | HEART RATE: 79 BPM | WEIGHT: 140 LBS | BODY MASS INDEX: 26.43 KG/M2 | HEIGHT: 61 IN

## 2023-12-11 PROCEDURE — 99213 OFFICE O/P EST LOW 20 MIN: CPT

## 2023-12-11 RX ORDER — PREDNISONE 5 MG/1
5 TABLET ORAL
Qty: 90 | Refills: 1 | Status: ACTIVE | COMMUNITY
Start: 2023-12-11 | End: 1900-01-01

## 2024-02-26 ENCOUNTER — APPOINTMENT (OUTPATIENT)
Dept: PULMONOLOGY | Facility: CLINIC | Age: 69
End: 2024-02-26
Payer: MEDICARE

## 2024-02-26 VITALS
WEIGHT: 140 LBS | OXYGEN SATURATION: 98 % | HEART RATE: 84 BPM | SYSTOLIC BLOOD PRESSURE: 120 MMHG | BODY MASS INDEX: 26.43 KG/M2 | DIASTOLIC BLOOD PRESSURE: 60 MMHG | HEIGHT: 61 IN

## 2024-02-26 PROCEDURE — 99213 OFFICE O/P EST LOW 20 MIN: CPT

## 2024-02-26 NOTE — PHYSICAL EXAM
[Normal Oropharynx] : normal oropharynx [No Acute Distress] : no acute distress [Normal Appearance] : normal appearance [No Neck Mass] : no neck mass [Normal Rate/Rhythm] : normal rate/rhythm [Normal S1, S2] : normal s1, s2 [No Murmurs] : no murmurs [No Resp Distress] : no resp distress [Benign] : benign [No Abnormalities] : no abnormalities [Clear to Auscultation Bilaterally] : clear to auscultation bilaterally [No Clubbing] : no clubbing [Normal Gait] : normal gait [No Edema] : no edema [No Cyanosis] : no cyanosis [FROM] : FROM [Normal Color/ Pigmentation] : normal color/ pigmentation [No Focal Deficits] : no focal deficits [Oriented x3] : oriented x3 [Normal Affect] : normal affect

## 2024-02-26 NOTE — ASSESSMENT
[FreeTextEntry1] : Patient with cryptogenic organizing pneumonia on clinical grounds doing well on prednisone 7.5 mg daily.  Patient is advised to reduce the dose to 5 mg daily for 1 month then 5 mg every other day for 1 month then stop.  Patient will see me for follow-up in 2 months.

## 2024-02-26 NOTE — HISTORY OF PRESENT ILLNESS
[TextBox_4] : 68-year-old female with a diagnosis of cryptogenic organizing pneumonia on clinical grounds treated in March of last year.  The patient is currently on prednisone 7.5 mg.  She did not reduce her dose as recommended last visit.  She remains on 7 and half milligrams daily.  She feels well with no symptoms of cough, shortness of breath or wheezing.  She is moving to [place] in June.

## 2024-02-27 ENCOUNTER — APPOINTMENT (OUTPATIENT)
Dept: FAMILY MEDICINE | Facility: CLINIC | Age: 69
End: 2024-02-27
Payer: MEDICARE

## 2024-02-27 DIAGNOSIS — E78.2 MIXED HYPERLIPIDEMIA: ICD-10-CM

## 2024-02-27 PROCEDURE — 36415 COLL VENOUS BLD VENIPUNCTURE: CPT

## 2024-02-27 PROCEDURE — G2211 COMPLEX E/M VISIT ADD ON: CPT

## 2024-02-27 PROCEDURE — 99214 OFFICE O/P EST MOD 30 MIN: CPT

## 2024-02-28 PROBLEM — E78.2 HYPERLIPIDEMIA, MIXED: Status: ACTIVE | Noted: 2021-03-06

## 2024-02-28 LAB
ALBUMIN SERPL ELPH-MCNC: 4 G/DL
ALP BLD-CCNC: 94 U/L
ALT SERPL-CCNC: 15 U/L
ANION GAP SERPL CALC-SCNC: 10 MMOL/L
AST SERPL-CCNC: 20 U/L
BASOPHILS # BLD AUTO: 0.04 K/UL
BASOPHILS NFR BLD AUTO: 0.4 %
BILIRUB SERPL-MCNC: 0.2 MG/DL
BUN SERPL-MCNC: 24 MG/DL
CALCIUM SERPL-MCNC: 9.6 MG/DL
CHLORIDE SERPL-SCNC: 100 MMOL/L
CHOLEST SERPL-MCNC: 236 MG/DL
CO2 SERPL-SCNC: 26 MMOL/L
CREAT SERPL-MCNC: 0.65 MG/DL
EGFR: 95 ML/MIN/1.73M2
EOSINOPHIL # BLD AUTO: 0.29 K/UL
EOSINOPHIL NFR BLD AUTO: 3.2 %
ESTIMATED AVERAGE GLUCOSE: 229 MG/DL
GLUCOSE SERPL-MCNC: 240 MG/DL
HBA1C MFR BLD HPLC: 9.6 %
HCT VFR BLD CALC: 39.1 %
HDLC SERPL-MCNC: 51 MG/DL
HGB BLD-MCNC: 12.6 G/DL
IMM GRANULOCYTES NFR BLD AUTO: 0.3 %
LDLC SERPL CALC-MCNC: 111 MG/DL
LYMPHOCYTES # BLD AUTO: 3.68 K/UL
LYMPHOCYTES NFR BLD AUTO: 41.1 %
MAN DIFF?: NORMAL
MCHC RBC-ENTMCNC: 29.4 PG
MCHC RBC-ENTMCNC: 32.2 GM/DL
MCV RBC AUTO: 91.4 FL
MONOCYTES # BLD AUTO: 0.73 K/UL
MONOCYTES NFR BLD AUTO: 8.1 %
NEUTROPHILS # BLD AUTO: 4.19 K/UL
NEUTROPHILS NFR BLD AUTO: 46.9 %
NONHDLC SERPL-MCNC: 185 MG/DL
PLATELET # BLD AUTO: 290 K/UL
POTASSIUM SERPL-SCNC: 4.8 MMOL/L
PROT SERPL-MCNC: 6.7 G/DL
RBC # BLD: 4.28 M/UL
RBC # FLD: 13.5 %
SODIUM SERPL-SCNC: 137 MMOL/L
TRIGL SERPL-MCNC: 432 MG/DL
WBC # FLD AUTO: 8.96 K/UL

## 2024-02-28 RX ORDER — SITAGLIPTIN 100 MG/1
100 TABLET, FILM COATED ORAL
Qty: 90 | Refills: 3 | Status: ACTIVE | COMMUNITY
Start: 2024-02-28 | End: 1900-01-01

## 2024-02-28 NOTE — HISTORY OF PRESENT ILLNESS
[FreeTextEntry1] : Follow up [de-identified] :  69 yo F with cryptogenic organizing pneumonia, previously on high dose steroids, now on prednisone 5mg daily, DM2 exacerbated by prednisone use, osteoporosis, HLD, presenting for follow up. Previous Hgb A1c elevated, above 10, patient declined additional medications/therapy. Accompanied by daughter at this visit. Moving to [place], [place] this summer.

## 2024-02-28 NOTE — PLAN
[FreeTextEntry1] : 67 yo F with cryptogenic organizing pneumonia, previously on high dose steroids, now on prednisone 5mg daily, DM2 exacerbated by prednisone use, osteoporosis, HLD, presenting for follow up.  DM2 - patient here for follow up, elevated Hgb a1c above 10 previously, labs drawn in office, will start jardiance or januvia if remains elevated, all questions answered HLD - labs drawn in office, on statin COOP - plan to taper prednisone 5mg to every other day for an additional month and then come off All questions answered 3 month follow up

## 2024-03-05 ENCOUNTER — APPOINTMENT (OUTPATIENT)
Dept: FAMILY MEDICINE | Facility: CLINIC | Age: 69
End: 2024-03-05

## 2024-03-06 ENCOUNTER — APPOINTMENT (OUTPATIENT)
Dept: FAMILY MEDICINE | Facility: CLINIC | Age: 69
End: 2024-03-06
Payer: MEDICARE

## 2024-03-06 VITALS
HEIGHT: 61 IN | HEART RATE: 82 BPM | DIASTOLIC BLOOD PRESSURE: 60 MMHG | SYSTOLIC BLOOD PRESSURE: 100 MMHG | OXYGEN SATURATION: 100 %

## 2024-03-06 DIAGNOSIS — H10.33 UNSPECIFIED ACUTE CONJUNCTIVITIS, BILATERAL: ICD-10-CM

## 2024-03-06 PROCEDURE — 99213 OFFICE O/P EST LOW 20 MIN: CPT

## 2024-03-06 RX ORDER — OFLOXACIN 3 MG/ML
0.3 SOLUTION/ DROPS OPHTHALMIC 4 TIMES DAILY
Qty: 1 | Refills: 0 | Status: ACTIVE | COMMUNITY
Start: 2024-03-06 | End: 1900-01-01

## 2024-03-08 PROBLEM — H10.33 ACUTE BACTERIAL CONJUNCTIVITIS OF BOTH EYES: Status: ACTIVE | Noted: 2024-03-06

## 2024-03-08 NOTE — HISTORY OF PRESENT ILLNESS
[FreeTextEntry8] : 68 year old female presenting with red, painful, itchy and crusted eyes since Sunday; initially was right now left as well. Pt took moxifloxacin drops for 2 days that she had left over and it ran out.

## 2024-03-08 NOTE — PHYSICAL EXAM
[Normal] : normal rate, regular rhythm, normal S1 and S2 and no murmur heard [de-identified] : both eyes: red with discharge

## 2024-03-22 ENCOUNTER — APPOINTMENT (OUTPATIENT)
Dept: GASTROENTEROLOGY | Facility: CLINIC | Age: 69
End: 2024-03-22
Payer: MEDICARE

## 2024-03-22 ENCOUNTER — APPOINTMENT (OUTPATIENT)
Dept: GASTROENTEROLOGY | Facility: CLINIC | Age: 69
End: 2024-03-22

## 2024-03-22 VITALS
DIASTOLIC BLOOD PRESSURE: 64 MMHG | OXYGEN SATURATION: 98 % | SYSTOLIC BLOOD PRESSURE: 110 MMHG | BODY MASS INDEX: 26.43 KG/M2 | WEIGHT: 140 LBS | HEIGHT: 61 IN | HEART RATE: 80 BPM

## 2024-03-22 DIAGNOSIS — K59.09 OTHER CONSTIPATION: ICD-10-CM

## 2024-03-22 DIAGNOSIS — E11.9 TYPE 2 DIABETES MELLITUS W/OUT COMPLICATIONS: ICD-10-CM

## 2024-03-22 DIAGNOSIS — Z12.11 ENCOUNTER FOR SCREENING FOR MALIGNANT NEOPLASM OF COLON: ICD-10-CM

## 2024-03-22 PROCEDURE — 99203 OFFICE O/P NEW LOW 30 MIN: CPT

## 2024-03-22 RX ORDER — ALENDRONATE SODIUM 70 MG/1
70 TABLET ORAL
Qty: 12 | Refills: 3 | Status: DISCONTINUED | COMMUNITY
End: 2024-03-22

## 2024-03-22 RX ORDER — ACETAMINOPHEN AND CODEINE 300; 30 MG/1; MG/1
300-30 TABLET ORAL
Qty: 20 | Refills: 0 | Status: DISCONTINUED | COMMUNITY
Start: 2022-11-19 | End: 2024-03-22

## 2024-03-22 RX ORDER — KRILL/OM-3/DHA/EPA/PHOSPHO/AST 1000-230MG
81 CAPSULE ORAL
Refills: 0 | Status: DISCONTINUED | COMMUNITY
Start: 2020-12-03 | End: 2024-03-22

## 2024-03-22 NOTE — CONSULT LETTER
[FreeTextEntry1] : Dear Dr. MINI CLEMENTS ,  I had the pleasure of evaluating your patient,  NERY WOMACK.  Please refer to my note below.  Thank you very much for allowing me to participate in the care of this patient.  If you have any questions, please do not hesitate to contact me.  Sincerely,   Jaron Lucio MD

## 2024-03-22 NOTE — HISTORY OF PRESENT ILLNESS
[FreeTextEntry1] : 68f DM,  colon polyps, choly, cryptogenic organizing pna/steroids chronically, presenting for colon cancer screening. Pt denies abdominal pain, rectal bleeding, change in bowel habits, or unexplained weight loss.    She does not have GERD but is on antacid tx b/c nsaid, prednisone use.    No abd pain, no wt loss.  Has chronic constipation.  Hard stools. No triggers.  2/2/24 normal cbc, lfts, bmet exc gluc 240.  Last colonsocopy - Dr. Ruiz 9/2017 multiple (at least 7, many proximal colon) - hyperplastic polyps, int hemorrhoids, repeat 3y  Soc:  no tobacco or significant EtOH FHx: no FHx GI malignancy or IBD  ROS: Constitutional:: no weight loss, fevers ENT: no deafness Eyes: not blind Neck: no LN Chest: no dyspnea/cough Cardiac: no chest pain Vascular: no leg swelling GI: no abdominal pain, nausea, vomiting, diarrhea, constipation, rectal bleeding, dysphagia, melena unless otherwise noted in HPI : no dysuria, dark urine Skin: no rashes, jaundice Heme: no bleeding Endocrine: no DM unless otherwise stated in HPI  Px: (VS noted below) General: NAD Eyes: anicteric Oropharynx:  clear Neck: no LN Chest: normal respiratory effort CVS: regular Abd: soft, NT, ND, +BS, no HSM Ext: no atrophy Neuro: grossly nonfocal  Labs/imaging/prior endoscopic results reviewed to the extent available and noted in HPI

## 2024-03-22 NOTE — ASSESSMENT
[FreeTextEntry1] : - Colon cancer screening - colonoscopy due - Colonoscopy scheduled - Risks, benefits, alternatives were discussed, including but not limited to bleeding, infection, perforation and sedation risks. Additionally, the possibility of missed lesions was conveyed.  -Constipation - reviewed dietary and lifestyle modifications, including increased fluid, fiber intake, as well as habituation / following urge to defecate, cutting back on bread/pasta, and to consider starting fiber supplement (eg.,  psyllium)  - Diabetes Mellitus:  Medication regimen adjusted for prep/procedure.  PMD/consultation/hospital notes and Labs/imaging/prior endoscopic results reviewed to extent noted in HPI; and, if procedure code billed on this visit for lab draw, this serves to signify that labs were drawn here in this office.

## 2024-03-31 ENCOUNTER — RESULT REVIEW (OUTPATIENT)
Age: 69
End: 2024-03-31

## 2024-04-01 ENCOUNTER — APPOINTMENT (OUTPATIENT)
Dept: GASTROENTEROLOGY | Facility: HOSPITAL | Age: 69
End: 2024-04-01

## 2024-04-29 ENCOUNTER — APPOINTMENT (OUTPATIENT)
Dept: PULMONOLOGY | Facility: CLINIC | Age: 69
End: 2024-04-29
Payer: MEDICARE

## 2024-04-29 VITALS
SYSTOLIC BLOOD PRESSURE: 106 MMHG | HEIGHT: 61 IN | OXYGEN SATURATION: 98 % | HEART RATE: 92 BPM | DIASTOLIC BLOOD PRESSURE: 54 MMHG | WEIGHT: 140 LBS | BODY MASS INDEX: 26.43 KG/M2

## 2024-04-29 DIAGNOSIS — J84.116 CRYPTOGENIC ORGANIZING PNEUMONIA: ICD-10-CM

## 2024-04-29 PROCEDURE — 99214 OFFICE O/P EST MOD 30 MIN: CPT

## 2024-04-29 NOTE — ASSESSMENT
[FreeTextEntry1] : Patient with diagnosis of cryptogenic organizing pneumonia diagnosed on clinical grounds.  Patient has responded nicely to prednisone over the past 1 year.  We will stop prednisone and have patient do a chest x-ray.  She will be followed clinically and [place] which she is moving to in June.

## 2024-04-29 NOTE — HISTORY OF PRESENT ILLNESS
[TextBox_4] : Patient asymptomatic.  She was taking prednisone 5 mg every other day for the past 1 month.  She has no symptoms of shortness of breath cough or wheezing.  She feels well.  She will be moving to [place] in June.

## 2024-05-16 RX ORDER — ATORVASTATIN CALCIUM 20 MG/1
20 TABLET, FILM COATED ORAL DAILY
Qty: 90 | Refills: 3 | Status: ACTIVE | COMMUNITY
Start: 2021-06-12 | End: 1900-01-01

## 2024-05-16 RX ORDER — LANCETS 28 GAUGE
EACH MISCELLANEOUS
Qty: 100 | Refills: 3 | Status: ACTIVE | COMMUNITY
Start: 2019-03-12 | End: 1900-01-01

## 2024-05-16 RX ORDER — METFORMIN HYDROCHLORIDE 500 MG/1
500 TABLET, COATED ORAL
Qty: 360 | Refills: 1 | Status: ACTIVE | COMMUNITY
Start: 2020-12-03 | End: 1900-01-01

## 2024-05-31 ENCOUNTER — APPOINTMENT (OUTPATIENT)
Dept: FAMILY MEDICINE | Facility: CLINIC | Age: 69
End: 2024-05-31

## 2024-06-17 ENCOUNTER — APPOINTMENT (OUTPATIENT)
Dept: FAMILY MEDICINE | Facility: CLINIC | Age: 69
End: 2024-06-17

## 2024-06-17 ENCOUNTER — RESULT REVIEW (OUTPATIENT)
Age: 69
End: 2024-06-17

## 2024-06-17 VITALS
BODY MASS INDEX: 25.11 KG/M2 | OXYGEN SATURATION: 100 % | DIASTOLIC BLOOD PRESSURE: 70 MMHG | SYSTOLIC BLOOD PRESSURE: 120 MMHG | HEART RATE: 72 BPM | HEIGHT: 61 IN | WEIGHT: 133 LBS

## 2024-06-17 PROCEDURE — 36415 COLL VENOUS BLD VENIPUNCTURE: CPT

## 2024-06-17 PROCEDURE — G0439: CPT

## 2024-06-17 NOTE — HEALTH RISK ASSESSMENT
[No] : No [No falls in past year] : Patient reported no falls in the past year [0] : 2) Feeling down, depressed, or hopeless: Not at all (0) [Never] : Never [Patient reported mammogram was normal] : Patient reported mammogram was normal [Patient reported PAP Smear was normal] : Patient reported PAP Smear was normal [Patient reported bone density results were normal] : Patient reported bone density results were normal [Patient reported colonoscopy was normal] : Patient reported colonoscopy was normal [Fully functional (bathing, dressing, toileting, transferring, walking, feeding)] : Fully functional (bathing, dressing, toileting, transferring, walking, feeding) [Fully functional (using the telephone, shopping, preparing meals, housekeeping, doing laundry, using] : Fully functional and needs no help or supervision to perform IADLs (using the telephone, shopping, preparing meals, housekeeping, doing laundry, using transportation, managing medications and managing finances) [[id number]] : 0 [MammogramDate] : 08/23 [PapSmearDate] : 09/23 [BoneDensityDate] : 08/23 [ColonoscopyDate] : 04/24

## 2024-06-18 LAB
25(OH)D3 SERPL-MCNC: 37.4 NG/ML
ALBUMIN SERPL ELPH-MCNC: 4.3 G/DL
ALP BLD-CCNC: 80 U/L
ALT SERPL-CCNC: 17 U/L
ANION GAP SERPL CALC-SCNC: 13 MMOL/L
APPEARANCE: CLEAR
AST SERPL-CCNC: 20 U/L
BASOPHILS # BLD AUTO: 0.06 K/UL
BASOPHILS NFR BLD AUTO: 0.7 %
BILIRUB SERPL-MCNC: 0.5 MG/DL
BILIRUBIN URINE: NEGATIVE
BLOOD URINE: NEGATIVE
BUN SERPL-MCNC: 19 MG/DL
CALCIUM SERPL-MCNC: 9.7 MG/DL
CHLORIDE SERPL-SCNC: 103 MMOL/L
CHOLEST SERPL-MCNC: 202 MG/DL
CO2 SERPL-SCNC: 25 MMOL/L
COLOR: YELLOW
CREAT SERPL-MCNC: 0.69 MG/DL
EGFR: 93 ML/MIN/1.73M2
EOSINOPHIL # BLD AUTO: 0.21 K/UL
EOSINOPHIL NFR BLD AUTO: 2.4 %
ESTIMATED AVERAGE GLUCOSE: 174 MG/DL
FOLATE SERPL-MCNC: >20 NG/ML
GLUCOSE QUALITATIVE U: NEGATIVE MG/DL
GLUCOSE SERPL-MCNC: 136 MG/DL
HBA1C MFR BLD HPLC: 7.7 %
HCT VFR BLD CALC: 40.7 %
HDLC SERPL-MCNC: 77 MG/DL
HGB BLD-MCNC: 12.9 G/DL
IMM GRANULOCYTES NFR BLD AUTO: 0.2 %
KETONES URINE: NEGATIVE MG/DL
LDLC SERPL CALC-MCNC: 110 MG/DL
LEUKOCYTE ESTERASE URINE: NEGATIVE
LYMPHOCYTES # BLD AUTO: 2.61 K/UL
LYMPHOCYTES NFR BLD AUTO: 30.2 %
MAN DIFF?: NORMAL
MCHC RBC-ENTMCNC: 28.9 PG
MCHC RBC-ENTMCNC: 31.7 GM/DL
MCV RBC AUTO: 91.3 FL
MONOCYTES # BLD AUTO: 0.75 K/UL
MONOCYTES NFR BLD AUTO: 8.7 %
NEUTROPHILS # BLD AUTO: 4.99 K/UL
NEUTROPHILS NFR BLD AUTO: 57.8 %
NITRITE URINE: NEGATIVE
NONHDLC SERPL-MCNC: 125 MG/DL
PH URINE: 5.5
PLATELET # BLD AUTO: 259 K/UL
POTASSIUM SERPL-SCNC: 4.7 MMOL/L
PROT SERPL-MCNC: 7.1 G/DL
PROTEIN URINE: NEGATIVE MG/DL
RBC # BLD: 4.46 M/UL
RBC # FLD: 14.8 %
SODIUM SERPL-SCNC: 141 MMOL/L
SPECIFIC GRAVITY URINE: 1.02
TRIGL SERPL-MCNC: 86 MG/DL
TSH SERPL-ACNC: 0.73 UIU/ML
UROBILINOGEN URINE: 0.2 MG/DL
VIT B12 SERPL-MCNC: 666 PG/ML
WBC # FLD AUTO: 8.64 K/UL

## 2024-06-18 RX ORDER — BLOOD SUGAR DIAGNOSTIC
STRIP MISCELLANEOUS DAILY
Qty: 100 | Refills: 3 | Status: ACTIVE | COMMUNITY
Start: 2019-03-12 | End: 1900-01-01